# Patient Record
Sex: FEMALE | Race: WHITE | Employment: OTHER | URBAN - METROPOLITAN AREA
[De-identification: names, ages, dates, MRNs, and addresses within clinical notes are randomized per-mention and may not be internally consistent; named-entity substitution may affect disease eponyms.]

---

## 2019-01-28 ENCOUNTER — ED HISTORICAL/CONVERTED ENCOUNTER (OUTPATIENT)
Dept: OTHER | Age: 84
End: 2019-01-28

## 2019-10-09 ENCOUNTER — ED HISTORICAL/CONVERTED ENCOUNTER (OUTPATIENT)
Dept: OTHER | Age: 84
End: 2019-10-09

## 2019-10-27 ENCOUNTER — ED HISTORICAL/CONVERTED ENCOUNTER (OUTPATIENT)
Dept: OTHER | Age: 84
End: 2019-10-27

## 2020-01-28 ENCOUNTER — OP HISTORICAL/CONVERTED ENCOUNTER (OUTPATIENT)
Dept: OTHER | Age: 85
End: 2020-01-28

## 2020-09-08 ENCOUNTER — ED HISTORICAL/CONVERTED ENCOUNTER (OUTPATIENT)
Dept: OTHER | Age: 85
End: 2020-09-08

## 2020-09-21 ENCOUNTER — APPOINTMENT (OUTPATIENT)
Dept: CT IMAGING | Age: 85
End: 2020-09-21
Attending: EMERGENCY MEDICINE
Payer: MEDICARE

## 2020-09-21 ENCOUNTER — HOSPITAL ENCOUNTER (EMERGENCY)
Age: 85
Discharge: HOME | End: 2020-09-21
Attending: EMERGENCY MEDICINE
Payer: MEDICARE

## 2020-09-21 VITALS
WEIGHT: 116 LBS | HEART RATE: 88 BPM | TEMPERATURE: 98.7 F | OXYGEN SATURATION: 97 % | HEIGHT: 62 IN | RESPIRATION RATE: 16 BRPM | SYSTOLIC BLOOD PRESSURE: 170 MMHG | DIASTOLIC BLOOD PRESSURE: 88 MMHG | BODY MASS INDEX: 21.35 KG/M2

## 2020-09-21 DIAGNOSIS — S09.90XA INJURY OF HEAD, INITIAL ENCOUNTER: ICD-10-CM

## 2020-09-21 DIAGNOSIS — S01.01XA LACERATION OF SCALP, INITIAL ENCOUNTER: ICD-10-CM
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
## 2020-09-21 PROCEDURE — 72125 CT NECK SPINE W/O DYE: CPT

## 2020-09-21 PROCEDURE — 99283 EMERGENCY DEPT VISIT LOW MDM: CPT

## 2020-09-21 PROCEDURE — 74011250636 HC RX REV CODE- 250/636: Performed by: EMERGENCY MEDICINE

## 2020-09-21 PROCEDURE — 90715 TDAP VACCINE 7 YRS/> IM: CPT | Performed by: EMERGENCY MEDICINE

## 2020-09-21 PROCEDURE — 70450 CT HEAD/BRAIN W/O DYE: CPT

## 2020-09-21 PROCEDURE — 74011250637 HC RX REV CODE- 250/637: Performed by: EMERGENCY MEDICINE

## 2020-09-21 RX ADMIN — ACETAMINOPHEN 650 MG: 650 SOLUTION ORAL at 07:34

## 2020-09-21 RX ADMIN — TETANUS TOXOID, REDUCED DIPHTHERIA TOXOID AND ACELLULAR PERTUSSIS VACCINE, ADSORBED 0.5 ML: 5; 2.5; 8; 8; 2.5 SUSPENSION INTRAMUSCULAR at 07:34

## 2020-09-21 NOTE — ED TRIAGE NOTES
Daughter reports GLF at home. Patient did hit head without LOC. No blood thinner use. Patient reports headache and left leg pain.

## 2020-09-21 NOTE — ED PROVIDER NOTES
EMERGENCY DEPARTMENT HISTORY AND PHYSICAL EXAM      Date: 9/21/2020  Patient Name: Yuliana Farah    History of Presenting Illness     Chief Complaint   Patient presents with    Fall       History Provided By: Patient and Patient's Daughter    HPI: Yuliana Farah, 80 y.o. female with a past medical history significant stroke presents to the ED with cc of difficulty with ambulation since her stroke. She has been unsteady. She fell backwards hitting the back of her head. No loss of consciousness. Occurred this morning. When she tried to get up she felt slightly weaker on the left side where she has had the stroke in the past.  This is only at the left hip. There is no pain however. She is able to ambulate. No upper extremity pain or trauma. Small cut on the back of her head. Not actively bleeding. Immunizations of tetanus are unknown to family. Mild dull headache. No pain medicine prior to arrival.  No nausea vomiting. There are no other complaints, changes, or physical findings at this time. PCP: Katie Corley MD    Current Outpatient Medications   Medication Sig Dispense Refill    dabigatran etexilate (PRADAXA) 75 mg capsule Take 1 capsule by mouth every twelve (12) hours. Resume taking this on December 4th after you have completed 2 weeks of COumadin. Stop you coumadin when you restart this Pradaxa. 1 capsule 0    oxyCODONE IR (ROXICODONE) 5 mg immediate release tablet Take 1-2 tablets by mouth every three (3) hours as needed for Pain. 80 tablet 0    acetaminophen (TYLENOL) 325 mg tablet Take 2 tablets by mouth every six (6) hours. 40 tablet 0    polyethylene glycol (MIRALAX) 17 gram packet Take 1 packet by mouth daily as needed (constipation). 1 packet 0    simvastatin (ZOCOR) 80 mg tablet Take 80 mg by mouth nightly.  meclizine (ANTIVERT) 25 mg tablet Take 25 mg by mouth as needed.  DULOXETINE HCL (CYMBALTA PO) Take 60 mg by mouth nightly.       metoprolol (LOPRESSOR) 50 mg tablet Take 50 mg by mouth two (2) times a day.  FESOTERODINE FUMARATE (TOVIAZ PO) Take 4 mg by mouth daily.  levothyroxine (SYNTHROID) 25 mcg tablet Take 25 mcg by mouth Daily (before breakfast).  ALPRAZolam (XANAX) 0.25 mg tablet Take 0.25 mg by mouth as needed for Anxiety.  Dexlansoprazole (DEXILANT) 60 mg CpDB Take  by mouth nightly.  lisinopril (PRINIVIL, ZESTRIL) 20 mg tablet Take 20 mg by mouth every morning. Past History     Past Medical History:  Past Medical History:   Diagnosis Date    Arrhythmia     atrial fib    Arthritis     Chronic pain     In back and right foot    GERD (gastroesophageal reflux disease)     Hypercholesterolemia     Hypertension     Other ill-defined conditions(799.89)     right ventricular \"thickening\" of heart    Other ill-defined conditions(799.89)     blood transfusion hx--    Psychiatric disorder     depression    Thyroid disease     Unspecified adverse effect of anesthesia     Itching       Past Surgical History:  Past Surgical History:   Procedure Laterality Date    HX CATARACT REMOVAL      bilateral    HX OPEN CHOLECYSTECTOMY      HX ORTHOPAEDIC      Right Foot Surgery x 6    HX ORTHOPAEDIC      plate in foot    HX ORTHOPAEDIC      HX ORTHOPAEDIC  3/12/12     RIGHT TOTAL HIP REPLACEMENT    HX OTHER SURGICAL      hemorrhoidectomy    HX TONSILLECTOMY         Family History:  Family History   Problem Relation Age of Onset    Breast Cancer Sister 61    Cancer Sister         breast cancer       Social History:  Social History     Tobacco Use    Smoking status: Former Smoker     Packs/day: 1.00     Years: 5.00     Pack years: 5.00     Last attempt to quit: 11/10/1991     Years since quittin.8    Smokeless tobacco: Never Used   Substance Use Topics    Alcohol use: No    Drug use: No       Allergies:   Allergies   Allergen Reactions    Macrobid [Nitrofurantoin Monohyd/M-Cryst] Other (comments)     Needed to be placed on life support after taking with coumadin    Codeine Nausea Only    Lidocaine Itching    Morphine Itching         Review of Systems   Review of Systems   Constitutional: Negative. Negative for chills, fatigue and fever. HENT: Negative. Negative for congestion, nosebleeds and sore throat. Eyes: Negative. Negative for pain, discharge and visual disturbance. Respiratory: Negative. Negative for cough, chest tightness and shortness of breath. Cardiovascular: Negative for chest pain, palpitations and leg swelling. Gastrointestinal: Negative for abdominal pain, blood in stool, constipation, diarrhea, nausea and vomiting. Endocrine: Negative. Genitourinary: Negative. Negative for difficulty urinating, dysuria, pelvic pain and vaginal bleeding. Musculoskeletal: Negative. Negative for arthralgias, back pain and myalgias. Skin: Positive for wound. Negative for rash. Allergic/Immunologic: Negative. Neurological: Positive for headaches. Negative for dizziness, syncope, weakness and numbness. Hematological: Negative. Psychiatric/Behavioral: Negative. Negative for agitation, confusion and suicidal ideas. All other systems reviewed and are negative. Physical Exam   Physical Exam  Vitals signs and nursing note reviewed. Exam conducted with a chaperone present. Constitutional:       Appearance: Normal appearance. She is normal weight. HENT:      Head: Normocephalic and atraumatic. Nose: Nose normal.      Mouth/Throat:      Mouth: Mucous membranes are moist.      Pharynx: Oropharynx is clear. Eyes:      Extraocular Movements: Extraocular movements intact. Conjunctiva/sclera: Conjunctivae normal.      Pupils: Pupils are equal, round, and reactive to light. Neck:      Musculoskeletal: Normal range of motion and neck supple. No neck rigidity or muscular tenderness. Cardiovascular:      Rate and Rhythm: Normal rate and regular rhythm. Pulses: Normal pulses. Heart sounds: Normal heart sounds. Pulmonary:      Effort: Pulmonary effort is normal. No respiratory distress. Breath sounds: Normal breath sounds. Abdominal:      General: Abdomen is flat. Bowel sounds are normal. There is no distension. Palpations: Abdomen is soft. Tenderness: There is no abdominal tenderness. There is no guarding. Musculoskeletal: Normal range of motion. General: No swelling, tenderness, deformity or signs of injury. Right lower leg: No edema. Left lower leg: No edema. Skin:     General: Skin is warm and dry. Capillary Refill: Capillary refill takes less than 2 seconds. Findings: No lesion or rash. Comments: Half centimeter puncture wound posterior scalp not actively bleeding. Neurological:      General: No focal deficit present. Mental Status: She is alert and oriented to person, place, and time. Mental status is at baseline. Cranial Nerves: No cranial nerve deficit. Psychiatric:         Mood and Affect: Mood normal.         Behavior: Behavior normal.         Thought Content: Thought content normal.         Judgment: Judgment normal.         Diagnostic Study Results     Labs -   No results found for this or any previous visit (from the past 12 hour(s)). Radiologic Studies -   CT HEAD WO CONT   Final Result   Impression:   No acute intracranial abnormality. Chronic changes as above. CT SPINE CERV WO CONT   Final Result   Impression:   Moderate degenerative disc disease. No evidence of acute traumatic injury of the   cervical spine. CT Results  (Last 48 hours)               09/21/20 0807  CT HEAD WO CONT Final result    Impression:  Impression:   No acute intracranial abnormality. Chronic changes as above. Narrative:  Study: Head CT without contrast.       Clinical indication: Fall, head injury.        Technique: Routine volume acquisition of the head was obtained without IV   contrast. Coronal and sagittal reformations were generated in soft tissue and   bone kernels. Dose reduction: All CT scans at this facility are performed using   dose reduction optimization techniques as appropriate to a performed exam   including the following: Automated exposure control, adjustments of the mA   and/or kV according to patient size, or use of iterative reconstruction   technique. Comparison: Head CT 9/15/2015. Findings:        Generalized cerebral volume loss with compensatory prominence of the ventricular   system. No evidence of acute intrarenal hemorrhage, mass effect, midline shift   or abnormal extra-axial fluid collection. Prominent periventricular and   subcortical white matter hypodensities bilaterally, suggesting chronic small   vessel ischemic change. Gray-white matter differentiation is maintained. Remote   infarction in the right basal ganglia. Moderate vascular calcifications. No evidence of skull fracture. Ventricles and basal cisterns are preserved and   are symmetric. Globes and orbits are intact. 09/21/20 0807  CT SPINE CERV WO CONT Final result    Impression:  Impression:   Moderate degenerative disc disease. No evidence of acute traumatic injury of the   cervical spine. Narrative:  Study: CT of the cervical spine without contrast.       Clinical indication: Fall, head injury. Technique: CT volume acquisition through the cervical spine was obtained. Axial,   sagittal, coronal images were provided in bone and soft tissue kernels. Dose   reduction: All CT scans at this facility are performed using dose reduction   optimization techniques as appropriate to a performed exam including the   following: Automated exposure control, adjustments of the mA and/or kV according   to patient size, or use of iterative reconstruction technique. Comparison: CT cervical spine 1/27/2011. Findings:       Straightening of the normal cervical lordosis.  Alignment is otherwise anatomic. Vertebral body heights are maintained. Moderate degenerative disc disease with   multilevel disc height loss, endplate changes and anterior/posterior   osteophytes. No prevertebral soft tissue swelling. Severe carotid atherosclerosis. Visualized   lung apices are clear. CXR Results  (Last 48 hours)    None          Medical Decision Making and ED Course   I am the first provider for this patient. I reviewed the vital signs, available nursing notes, past medical history, past surgical history, family history and social history. Vital Signs-Reviewed the patient's vital signs. Patient Vitals for the past 12 hrs:   Temp Pulse Resp BP SpO2   09/21/20 0657 98.7 °F (37.1 °C) 88 16 (!) 170/88 97 %       EKG interpretation:         Records Reviewed: Previous Hospital chart. EMS run report          ED Course:   Initial assessment performed. The patients presenting problems have been discussed, and they are in agreement with the care plan formulated and outlined with them. I have encouraged them to ask questions as they arise throughout their visit. CONSULTANTS:      Provider Notes (Medical Decision Making):   59-year-old female not on blood thinner suffered a mechanical fall this morning secondary to difficulty walking due to old strokes. Plan to rule out acute bleed with CT head. Update tetanus. Procedures                       Disposition           DISCHARGE PLAN:    Patient is discharged home. Discharge instructions provided. Patient is stable and improved at time of disposition. Vitals are stable. 1.   Current Discharge Medication List      CONTINUE these medications which have NOT CHANGED    Details   dabigatran etexilate (PRADAXA) 75 mg capsule Take 1 capsule by mouth every twelve (12) hours. Resume taking this on December 4th after you have completed 2 weeks of COumadin. Stop you coumadin when you restart this Pradaxa.   Qty: 1 capsule, Refills: 0 oxyCODONE IR (ROXICODONE) 5 mg immediate release tablet Take 1-2 tablets by mouth every three (3) hours as needed for Pain. Qty: 80 tablet, Refills: 0      acetaminophen (TYLENOL) 325 mg tablet Take 2 tablets by mouth every six (6) hours. Qty: 40 tablet, Refills: 0      polyethylene glycol (MIRALAX) 17 gram packet Take 1 packet by mouth daily as needed (constipation). Qty: 1 packet, Refills: 0      simvastatin (ZOCOR) 80 mg tablet Take 80 mg by mouth nightly. meclizine (ANTIVERT) 25 mg tablet Take 25 mg by mouth as needed. DULOXETINE HCL (CYMBALTA PO) Take 60 mg by mouth nightly. metoprolol (LOPRESSOR) 50 mg tablet Take 50 mg by mouth two (2) times a day. FESOTERODINE FUMARATE (TOVIAZ PO) Take 4 mg by mouth daily. levothyroxine (SYNTHROID) 25 mcg tablet Take 25 mcg by mouth Daily (before breakfast). ALPRAZolam (XANAX) 0.25 mg tablet Take 0.25 mg by mouth as needed for Anxiety. Dexlansoprazole (DEXILANT) 60 mg CpDB Take  by mouth nightly. lisinopril (PRINIVIL, ZESTRIL) 20 mg tablet Take 20 mg by mouth every morning. 2.   Follow-up Information     Follow up With Specialties Details Why Contact Terry Medina MD Family Medicine Call in 2 days  [facility] [address]  [address] [facility]  [phone]          3. Return to ED if worse     Pt voiced they understand they plan and do not have questions at this time        Diagnosis     Clinical Impression:   1. Fall, initial encounter    2. Injury of head, initial encounter    3. Laceration of scalp, initial encounter        Attestations:    Kenisha Cee MD    Please note that this dictation was completed with [employer], the computer voice recognition software. Quite often unanticipated grammatical, syntax, homophones, and other interpretive errors are inadvertently transcribed by the computer software. Please disregard these errors. Please excuse any errors that have escaped final proofreading. Thank you.

## 2020-09-21 NOTE — DISCHARGE INSTRUCTIONS
Patient Education        Preventing Falls: Care Instructions  Your Care Instructions    Getting around your home safely can be a challenge if you have injuries or health problems that make it easy for you to fall. Loose rugs and furniture in walkways are among the dangers for many older people who have problems walking or who have poor eyesight. People who have conditions such as arthritis, osteoporosis, or dementia also have to be careful not to fall. You can make your home safer with a few simple measures. Follow-up care is a key part of your treatment and safety. Be sure to make and go to all appointments, and call your doctor if you are having problems. It's also a good idea to know your test results and keep a list of the medicines you take. How can you care for yourself at home? Taking care of yourself  · You may get dizzy if you do not drink enough water. To prevent dehydration, drink plenty of fluids, enough so that your urine is light yellow or clear like water. Choose water and other caffeine-free clear liquids. If you have kidney, heart, or liver disease and have to limit fluids, talk with your doctor before you increase the amount of fluids you drink. · Exercise regularly to improve your strength, muscle tone, and balance. Walk if you can. Swimming may be a good choice if you cannot walk easily. · Have your vision and hearing checked each year or any time you notice a change. If you have trouble seeing and hearing, you might not be able to avoid objects and could lose your balance. · Know the side effects of the medicines you take. Ask your doctor or pharmacist whether the medicines you take can affect your balance. Sleeping pills or sedatives can affect your balance. · Limit the amount of alcohol you drink. Alcohol can impair your balance and other senses. · Ask your doctor whether calluses or corns on your feet need to be removed.  If you wear loose-fitting shoes because of calluses or corns, you can lose your balance and fall. · Talk to your doctor if you have numbness in your feet. Preventing falls at home  · Remove raised doorway thresholds, throw rugs, and clutter. Repair loose carpet or raised areas in the floor. · Move furniture and electrical cords to keep them out of walking paths. · Use nonskid floor wax, and wipe up spills right away, especially on ceramic tile floors. · If you use a walker or cane, put rubber tips on it. If you use crutches, clean the bottoms of them regularly with an abrasive pad, such as steel wool. · Keep your house well lit, especially [place], and outside walkways. Use night-lights in areas such as hallways and bathrooms. Add extra light switches or use remote switches (such as switches that go on or off when you clap your hands) to make it easier to turn lights on if you have to get up during the night. · Install sturdy handrails on stairways. · Move items in your cabinets so that the things you use a lot are on the lower shelves (about waist level). · Keep a cordless phone and a flashlight with new batteries by your bed. If possible, put a phone in each of the main rooms of your house, or carry a cell phone in case you fall and cannot reach a phone. Or, you can wear a device around your neck or wrist. You push a button that sends a signal for help. · Wear low-heeled shoes that fit well and give your feet good support. Use footwear with nonskid soles. Check the heels and soles of your shoes for wear. Repair or replace worn heels or soles. · Do not wear socks without shoes on wood floors. · Walk on the grass when the sidewalks are slippery. If you live in an area that gets snow and ice in the winter, sprinkle salt on slippery steps and sidewalks. Preventing falls in the bath  · Install grab bars and nonskid mats inside and outside your shower or tub and near the toilet and sinks. · Use shower chairs and bath benches.   · Use a hand-held shower head that will allow you to sit while showering. · Get into a tub or shower by putting the weaker leg in first. Get out of a tub or shower with your strong side first.  · Repair loose toilet seats and consider installing a raised toilet seat to make getting on and off the toilet easier. · Keep your bathroom door unlocked while you are in the shower. Where can you learn more? Go to http://www.pereira.com/. Enter [phone] in the search box to learn more about \"Preventing Falls: Care Instructions. \"  Current as of: March 16, 2018  Content Version: 11.8  © 6117-3589 [employer], [employer]. Care instructions adapted under license by [employer] (which disclaims liability or warranty for this information). If you have questions about a medical condition or this instruction, always ask your healthcare professional. [address] any warranty or liability for your use of this information.

## 2020-11-16 ENCOUNTER — APPOINTMENT (OUTPATIENT)
Dept: CT IMAGING | Age: 85
End: 2020-11-16
Attending: EMERGENCY MEDICINE
Payer: MEDICARE

## 2020-11-16 ENCOUNTER — HOSPITAL ENCOUNTER (INPATIENT)
Age: 85
LOS: 3 days | Discharge: HOME | DRG: 884 | End: 2020-11-19
Attending: FAMILY MEDICINE | Admitting: INTERNAL MEDICINE
Payer: MEDICARE

## 2020-11-16 ENCOUNTER — APPOINTMENT (OUTPATIENT)
Dept: GENERAL RADIOLOGY | Age: 85
End: 2020-11-16
Attending: EMERGENCY MEDICINE
Payer: MEDICARE

## 2020-11-16 ENCOUNTER — HOSPITAL ENCOUNTER (EMERGENCY)
Age: 85
Discharge: OTHER INSTITUTION | End: 2020-11-16
Attending: EMERGENCY MEDICINE
Payer: MEDICARE

## 2020-11-16 VITALS
HEART RATE: 93 BPM | HEIGHT: 62 IN | DIASTOLIC BLOOD PRESSURE: 110 MMHG | OXYGEN SATURATION: 97 % | BODY MASS INDEX: 21.35 KG/M2 | TEMPERATURE: 98.6 F | WEIGHT: 116 LBS | RESPIRATION RATE: 16 BRPM | SYSTOLIC BLOOD PRESSURE: 183 MMHG

## 2020-11-16 DIAGNOSIS — R77.8 ELEVATED TROPONIN: ICD-10-CM

## 2020-11-16 DIAGNOSIS — I10 BENIGN ESSENTIAL HTN: ICD-10-CM

## 2020-11-16 DIAGNOSIS — R41.82 ALTERED MENTAL STATUS, UNSPECIFIED ALTERED MENTAL STATUS TYPE: ICD-10-CM

## 2020-11-16 DIAGNOSIS — I48.20 CHRONIC A-FIB (HCC): ICD-10-CM

## 2020-11-16 DIAGNOSIS — R41.0 ACUTE DELIRIUM: ICD-10-CM

## 2020-11-16 DIAGNOSIS — I21.4 NSTEMI (NON-ST ELEVATED MYOCARDIAL INFARCTION) (HCC): Primary | ICD-10-CM

## 2020-11-16 LAB
ALBUMIN SERPL-MCNC: 3.7 G/DL (ref 3.5–5)
ALBUMIN/GLOB SERPL: 1.2 {RATIO} (ref 1.1–2.2)
ALP SERPL-CCNC: 60 U/L (ref 45–117)
ALT SERPL-CCNC: 13 U/L (ref 12–78)
ANION GAP SERPL CALC-SCNC: 7 MMOL/L (ref 5–15)
APPEARANCE UR: CLEAR
AST SERPL W P-5'-P-CCNC: 18 U/L (ref 15–37)
BACTERIA URNS QL MICRO: NEGATIVE /HPF
BASOPHILS # BLD: 0.1 K/UL (ref 0–0.1)
BASOPHILS NFR BLD: 1 % (ref 0–1)
BILIRUB SERPL-MCNC: 0.5 MG/DL (ref 0.2–1)
BILIRUB UR QL: NEGATIVE
BUN SERPL-MCNC: 23 MG/DL (ref 6–20)
BUN/CREAT SERPL: 26 (ref 12–20)
CA-I BLD-MCNC: 9.9 MG/DL (ref 8.5–10.1)
CHLORIDE SERPL-SCNC: 106 MMOL/L (ref 97–108)
CO2 SERPL-SCNC: 27 MMOL/L (ref 21–32)
COLOR UR: YELLOW
CREAT SERPL-MCNC: 0.87 MG/DL (ref 0.55–1.02)
DIFFERENTIAL METHOD BLD: ABNORMAL
EOSINOPHIL # BLD: 0.2 K/UL (ref 0–0.4)
EOSINOPHIL NFR BLD: 2 % (ref 0–7)
EPITH CASTS URNS QL MICRO: ABNORMAL /LPF
ERYTHROCYTE [DISTWIDTH] IN BLOOD BY AUTOMATED COUNT: 13.1 % (ref 11.5–14.5)
GLOBULIN SER CALC-MCNC: 3.1 G/DL (ref 2–4)
GLUCOSE SERPL-MCNC: 99 MG/DL (ref 65–100)
GLUCOSE UR STRIP.AUTO-MCNC: NEGATIVE MG/DL
HCT VFR BLD AUTO: 37.6 % (ref 35–47)
HGB BLD-MCNC: 11.9 G/DL (ref 11.5–16)
HGB UR QL STRIP: ABNORMAL
IMM GRANULOCYTES # BLD AUTO: 0 K/UL (ref 0–0.04)
IMM GRANULOCYTES NFR BLD AUTO: 0 % (ref 0–0.5)
KETONES UR QL STRIP.AUTO: NEGATIVE MG/DL
LEUKOCYTE ESTERASE UR QL STRIP.AUTO: NEGATIVE
LYMPHOCYTES # BLD: 1.5 K/UL (ref 0.8–3.5)
LYMPHOCYTES NFR BLD: 22 % (ref 12–49)
MCH RBC QN AUTO: 31.5 PG (ref 26–34)
MCHC RBC AUTO-ENTMCNC: 31.6 G/DL (ref 30–36.5)
MCV RBC AUTO: 99.5 FL (ref 80–99)
MONOCYTES # BLD: 0.6 K/UL (ref 0–1)
MONOCYTES NFR BLD: 9 % (ref 5–13)
NEUTS SEG # BLD: 4.6 K/UL (ref 1.8–8)
NEUTS SEG NFR BLD: 66 % (ref 32–75)
NITRITE UR QL STRIP.AUTO: NEGATIVE
PH UR STRIP: 5 [PH] (ref 5–8)
PLATELET # BLD AUTO: 184 K/UL (ref 150–400)
PMV BLD AUTO: 9.3 FL (ref 8.9–12.9)
POTASSIUM SERPL-SCNC: 4 MMOL/L (ref 3.5–5.1)
PROT SERPL-MCNC: 6.8 G/DL (ref 6.4–8.2)
PROT UR STRIP-MCNC: NEGATIVE MG/DL
RBC # BLD AUTO: 3.78 M/UL (ref 3.8–5.2)
RBC #/AREA URNS HPF: ABNORMAL /HPF (ref 0–5)
SODIUM SERPL-SCNC: 140 MMOL/L (ref 136–145)
SP GR UR REFRACTOMETRY: 1.02 (ref 1–1.03)
TROPONIN I SERPL-MCNC: 0.1 NG/ML
TROPONIN I SERPL-MCNC: 0.11 NG/ML
UA: UC IF INDICATED,UAUC: ABNORMAL
UROBILINOGEN UR QL STRIP.AUTO: 0.1 EU/DL (ref 0.2–1)
WBC # BLD AUTO: 6.9 K/UL (ref 3.6–11)
WBC URNS QL MICRO: ABNORMAL /HPF (ref 0–4)

## 2020-11-16 PROCEDURE — 71045 X-RAY EXAM CHEST 1 VIEW: CPT

## 2020-11-16 PROCEDURE — 93005 ELECTROCARDIOGRAM TRACING: CPT

## 2020-11-16 PROCEDURE — 80053 COMPREHEN METABOLIC PANEL: CPT

## 2020-11-16 PROCEDURE — 84484 ASSAY OF TROPONIN QUANT: CPT

## 2020-11-16 PROCEDURE — 74011250636 HC RX REV CODE- 250/636: Performed by: INTERNAL MEDICINE

## 2020-11-16 PROCEDURE — 81001 URINALYSIS AUTO W/SCOPE: CPT

## 2020-11-16 PROCEDURE — 99285 EMERGENCY DEPT VISIT HI MDM: CPT

## 2020-11-16 PROCEDURE — 85025 COMPLETE CBC W/AUTO DIFF WBC: CPT

## 2020-11-16 PROCEDURE — 70450 CT HEAD/BRAIN W/O DYE: CPT

## 2020-11-16 PROCEDURE — 65660000000 HC RM CCU STEPDOWN

## 2020-11-16 PROCEDURE — 36415 COLL VENOUS BLD VENIPUNCTURE: CPT

## 2020-11-16 PROCEDURE — 74011250637 HC RX REV CODE- 250/637: Performed by: EMERGENCY MEDICINE

## 2020-11-16 PROCEDURE — 74011250637 HC RX REV CODE- 250/637: Performed by: INTERNAL MEDICINE

## 2020-11-16 RX ORDER — DABIGATRAN ETEXILATE 75 MG/1
75 CAPSULE ORAL EVERY 12 HOURS
Status: DISCONTINUED | OUTPATIENT
Start: 2020-11-17 | End: 2020-11-17

## 2020-11-16 RX ORDER — ACETAMINOPHEN 325 MG/1
650 TABLET ORAL
Status: DISCONTINUED | OUTPATIENT
Start: 2020-11-16 | End: 2020-11-19 | Stop reason: HOSPADM

## 2020-11-16 RX ORDER — OXYCODONE HYDROCHLORIDE 5 MG/1
5-10 TABLET ORAL
Status: DISCONTINUED | OUTPATIENT
Start: 2020-11-16 | End: 2020-11-17

## 2020-11-16 RX ORDER — ONDANSETRON 2 MG/ML
4 INJECTION INTRAMUSCULAR; INTRAVENOUS
Status: DISCONTINUED | OUTPATIENT
Start: 2020-11-16 | End: 2020-11-19 | Stop reason: HOSPADM

## 2020-11-16 RX ORDER — PROMETHAZINE HYDROCHLORIDE 25 MG/1
12.5 TABLET ORAL
Status: DISCONTINUED | OUTPATIENT
Start: 2020-11-16 | End: 2020-11-19 | Stop reason: HOSPADM

## 2020-11-16 RX ORDER — ACETAMINOPHEN 650 MG/1
650 SUPPOSITORY RECTAL
Status: DISCONTINUED | OUTPATIENT
Start: 2020-11-16 | End: 2020-11-19 | Stop reason: HOSPADM

## 2020-11-16 RX ORDER — DULOXETIN HYDROCHLORIDE 60 MG/1
60 CAPSULE, DELAYED RELEASE ORAL
Status: DISCONTINUED | OUTPATIENT
Start: 2020-11-16 | End: 2020-11-17

## 2020-11-16 RX ORDER — SODIUM CHLORIDE 0.9 % (FLUSH) 0.9 %
5-40 SYRINGE (ML) INJECTION EVERY 8 HOURS
Status: DISCONTINUED | OUTPATIENT
Start: 2020-11-16 | End: 2020-11-19 | Stop reason: HOSPADM

## 2020-11-16 RX ORDER — ATORVASTATIN CALCIUM 40 MG/1
40 TABLET, FILM COATED ORAL
Status: DISCONTINUED | OUTPATIENT
Start: 2020-11-17 | End: 2020-11-19 | Stop reason: HOSPADM

## 2020-11-16 RX ORDER — ALPRAZOLAM 0.25 MG/1
0.25 TABLET ORAL
Status: DISCONTINUED | OUTPATIENT
Start: 2020-11-17 | End: 2020-11-17

## 2020-11-16 RX ORDER — PANTOPRAZOLE SODIUM 40 MG/1
40 TABLET, DELAYED RELEASE ORAL
Status: DISCONTINUED | OUTPATIENT
Start: 2020-11-17 | End: 2020-11-19 | Stop reason: HOSPADM

## 2020-11-16 RX ORDER — LEVOTHYROXINE SODIUM 25 UG/1
25 TABLET ORAL
Status: DISCONTINUED | OUTPATIENT
Start: 2020-11-17 | End: 2020-11-19 | Stop reason: HOSPADM

## 2020-11-16 RX ORDER — SODIUM CHLORIDE 0.9 % (FLUSH) 0.9 %
5-40 SYRINGE (ML) INJECTION AS NEEDED
Status: DISCONTINUED | OUTPATIENT
Start: 2020-11-16 | End: 2020-11-19 | Stop reason: HOSPADM

## 2020-11-16 RX ORDER — MECLIZINE HCL 12.5 MG 12.5 MG/1
25 TABLET ORAL DAILY PRN
Status: DISCONTINUED | OUTPATIENT
Start: 2020-11-17 | End: 2020-11-19 | Stop reason: HOSPADM

## 2020-11-16 RX ORDER — SODIUM CHLORIDE, SODIUM LACTATE, POTASSIUM CHLORIDE, CALCIUM CHLORIDE 600; 310; 30; 20 MG/100ML; MG/100ML; MG/100ML; MG/100ML
75 INJECTION, SOLUTION INTRAVENOUS CONTINUOUS
Status: DISCONTINUED | OUTPATIENT
Start: 2020-11-16 | End: 2020-11-17

## 2020-11-16 RX ORDER — METOPROLOL TARTRATE 50 MG/1
50 TABLET ORAL 2 TIMES DAILY
Status: DISCONTINUED | OUTPATIENT
Start: 2020-11-17 | End: 2020-11-19 | Stop reason: HOSPADM

## 2020-11-16 RX ORDER — GUAIFENESIN 100 MG/5ML
162 LIQUID (ML) ORAL
Status: COMPLETED | OUTPATIENT
Start: 2020-11-16 | End: 2020-11-16

## 2020-11-16 RX ORDER — LISINOPRIL 20 MG/1
20 TABLET ORAL
Status: DISCONTINUED | OUTPATIENT
Start: 2020-11-17 | End: 2020-11-19 | Stop reason: HOSPADM

## 2020-11-16 RX ORDER — POLYETHYLENE GLYCOL 3350 17 G/17G
17 POWDER, FOR SOLUTION ORAL DAILY PRN
Status: DISCONTINUED | OUTPATIENT
Start: 2020-11-16 | End: 2020-11-19 | Stop reason: HOSPADM

## 2020-11-16 RX ADMIN — ACETAMINOPHEN 650 MG: 325 TABLET ORAL at 22:25

## 2020-11-16 RX ADMIN — SODIUM CHLORIDE, SODIUM LACTATE, POTASSIUM CHLORIDE, AND CALCIUM CHLORIDE 75 ML/HR: 600; 310; 30; 20 INJECTION, SOLUTION INTRAVENOUS at 22:27

## 2020-11-16 RX ADMIN — ASPIRIN 81 MG 162 MG: 81 TABLET ORAL at 15:36

## 2020-11-16 NOTE — ED NOTES
Pt is oriented to Name and place, confused on day of week,  Daughter said she is off and on with orientation. States that she refuses all meds for past 6 months. Lungs clear, denies cp or sob. VS taken and Dr. Diego Anglin notified of BP.

## 2020-11-16 NOTE — ED TRIAGE NOTES
Pt refuses to take home meds at least 6 months PCP is aware Yesterday, urine foul smell, weakness, states she has hallucinations, last one 1 year ago hasn't passed water in 24 hours. Hx UTI, Called urologist and PCP and they cant see her today so they came. PT knows name only which is normal for her.

## 2020-11-16 NOTE — ED PROVIDER NOTES
EMERGENCY DEPARTMENT HISTORY AND PHYSICAL EXAM      Date: 11/16/2020  Patient Name: Dionne Gonsalez    History of Presenting Illness     Chief Complaint   Patient presents with    Bladder Infection       History Provided By: Patient's Daughter    HPI: Dionne Gonsalez, 80 y.o. female with a history of atrial fibrillation, chronic pain, GERD, hypercholesterolemia, hypertension, depression, thyroid disease presents to the emergency department today because her daughter thinks that she has a urinary tract infection. She stated that for the last couple of days her urine has smelled foul and she has had generalized weakness as well as hallucinations last night. She has had a decrease in urination as well for the past 24 hours. Patient does have a history of UTI. They called the urologist and PCP and they cannot see her today so they came in. The patient apparently has not been taking her medications for the last 6 months and her PCP is aware. The patient used to be on maintenance medication to prevent UTIs but was taken off that a couple of months ago. The entire history is obtained by the patient's daughter. The patient has not yet been specifically diagnosed with dementia but they state that she has some test coming up for further evaluation of this. She is oriented to name only and apparently that is baseline for her. The daughter denies fevers or chills. No nausea or vomiting. The patient always complains of pain everywhere but has not specifically complained of any abdominal pain. There are no other complaints, changes, or physical findings at this time. PCP: Abdi Salvador MD    No current facility-administered medications on file prior to encounter. Current Outpatient Medications on File Prior to Encounter   Medication Sig Dispense Refill    dabigatran etexilate (PRADAXA) 75 mg capsule Take 1 capsule by mouth every twelve (12) hours.  Resume taking this on December 4th after you have completed 2 weeks of COumadin. Stop you coumadin when you restart this Pradaxa. 1 capsule 0    oxyCODONE IR (ROXICODONE) 5 mg immediate release tablet Take 1-2 tablets by mouth every three (3) hours as needed for Pain. 80 tablet 0    polyethylene glycol (MIRALAX) 17 gram packet Take 1 packet by mouth daily as needed (constipation). 1 packet 0    simvastatin (ZOCOR) 80 mg tablet Take 80 mg by mouth nightly.  meclizine (ANTIVERT) 25 mg tablet Take 25 mg by mouth as needed.  DULOXETINE HCL (CYMBALTA PO) Take 60 mg by mouth nightly.  metoprolol (LOPRESSOR) 50 mg tablet Take 50 mg by mouth two (2) times a day.  FESOTERODINE FUMARATE (TOVIAZ PO) Take 4 mg by mouth daily.  levothyroxine (SYNTHROID) 25 mcg tablet Take 25 mcg by mouth Daily (before breakfast).  ALPRAZolam (XANAX) 0.25 mg tablet Take 0.25 mg by mouth as needed for Anxiety.  Dexlansoprazole (DEXILANT) 60 mg CpDB Take  by mouth nightly.  lisinopril (PRINIVIL, ZESTRIL) 20 mg tablet Take 20 mg by mouth every morning.  [DISCONTINUED] acetaminophen (TYLENOL) 325 mg tablet Take 2 tablets by mouth every six (6) hours.  40 tablet 0       Past History     Past Medical History:  Past Medical History:   Diagnosis Date    Arrhythmia     atrial fib    Arthritis     Chronic pain     In back and right foot    GERD (gastroesophageal reflux disease)     Hypercholesterolemia     Hypertension     Other ill-defined conditions(799.89)     right ventricular \"thickening\" of heart    Other ill-defined conditions(799.89)     blood transfusion hx--2012    Psychiatric disorder     depression    Thyroid disease     Unspecified adverse effect of anesthesia     Itching       Past Surgical History:  Past Surgical History:   Procedure Laterality Date    HX CATARACT REMOVAL      bilateral    HX OPEN CHOLECYSTECTOMY      HX ORTHOPAEDIC      Right Foot Surgery x 6    HX ORTHOPAEDIC      plate in foot    HX ORTHOPAEDIC      HX ORTHOPAEDIC 3/12/12     RIGHT TOTAL HIP REPLACEMENT    HX OTHER SURGICAL      hemorrhoidectomy    HX TONSILLECTOMY         Family History:  Family History   Problem Relation Age of Onset    Breast Cancer Sister 61    Cancer Sister         breast cancer       Social History:  Social History     Tobacco Use    Smoking status: Former Smoker     Packs/day: 1.00     Years: 5.00     Pack years: 5.00     Last attempt to quit: 11/10/1991     Years since quittin.0    Smokeless tobacco: Never Used   Substance Use Topics    Alcohol use: Never     Frequency: Never    Drug use: Never       Allergies: Allergies   Allergen Reactions    Macrobid [Nitrofurantoin Monohyd/M-Cryst] Other (comments)     Needed to be placed on life support after taking with coumadin    Codeine Nausea Only    Lidocaine Itching    Morphine Itching         Review of Systems   Unable to obtain due to probable dementia and altered mental status  Review of Systems    Physical Exam     Physical Exam  Vitals signs and nursing note reviewed. Constitutional:       General: She is not in acute distress. Appearance: Normal appearance. She is not ill-appearing, toxic-appearing or diaphoretic. HENT:      Head: Normocephalic and atraumatic. Mouth/Throat:      Mouth: Mucous membranes are moist.      Pharynx: Oropharynx is clear. Eyes:      Extraocular Movements: Extraocular movements intact. Conjunctiva/sclera: Conjunctivae normal.      Pupils: Pupils are equal, round, and reactive to light. Neck:      Musculoskeletal: Neck supple. No neck rigidity. Cardiovascular:      Rate and Rhythm: Normal rate. Rhythm irregular. Heart sounds: Normal heart sounds. No murmur. No friction rub. No gallop. Pulmonary:      Effort: Pulmonary effort is normal. No respiratory distress. Breath sounds: Normal breath sounds. No wheezing, rhonchi or rales. Abdominal:      General: Abdomen is flat. There is no distension.       Palpations: Abdomen is soft.      Tenderness: There is abdominal tenderness. Musculoskeletal:         General: No swelling or tenderness. Lymphadenopathy:      Cervical: No cervical adenopathy. Skin:     General: Skin is warm and dry. Findings: No erythema or rash. Neurological:      General: No focal deficit present. Mental Status: She is alert. Comments: Awake and alert   Psychiatric:         Mood and Affect: Mood normal.         Behavior: Behavior normal.         Diagnostic Study Results     Labs -     Recent Results (from the past 12 hour(s))   URINALYSIS W/ REFLEX CULTURE    Collection Time: 11/16/20 11:30 AM    Specimen: Urine   Result Value Ref Range    Color Yellow      Appearance Clear Clear      Specific gravity 1.020 1.003 - 1.030      pH (UA) 5.0 5.0 - 8.0      Protein Negative Negative mg/dL    Glucose Negative Negative mg/dL    Ketone Negative Negative mg/dL    Bilirubin Negative Negative      Blood Trace (A) Negative      Urobilinogen 0.1 (L) 0.2 - 1.0 EU/dL    Nitrites Negative Negative      Leukocyte Esterase Negative Negative      WBC 0-4 0 - 4 /hpf    RBC 0-5 0 - 5 /hpf    Epithelial cells Moderate (A) Few /lpf    Bacteria Negative Negative /hpf    UA:UC IF INDICATED Culture not indicated by UA result Culture not indicated by UA result     CBC WITH AUTOMATED DIFF    Collection Time: 11/16/20 12:45 PM   Result Value Ref Range    WBC 6.9 3.6 - 11.0 K/uL    RBC 3.78 (L) 3.80 - 5.20 M/uL    HGB 11.9 11.5 - 16.0 g/dL    HCT 37.6 35.0 - 47.0 %    MCV 99.5 (H) 80.0 - 99.0 FL    MCH 31.5 26.0 - 34.0 PG    MCHC 31.6 30.0 - 36.5 g/dL    RDW 13.1 11.5 - 14.5 %    PLATELET 580 145 - 585 K/uL    MPV 9.3 8.9 - 12.9 FL    NEUTROPHILS 66 32 - 75 %    LYMPHOCYTES 22 12 - 49 %    MONOCYTES 9 5 - 13 %    EOSINOPHILS 2 0 - 7 %    BASOPHILS 1 0 - 1 %    IMMATURE GRANULOCYTES 0 0.0 - 0.5 %    ABS. NEUTROPHILS 4.6 1.8 - 8.0 K/UL    ABS. LYMPHOCYTES 1.5 0.8 - 3.5 K/UL    ABS. MONOCYTES 0.6 0.0 - 1.0 K/UL    ABS. EOSINOPHILS 0.2 0.0 - 0.4 K/UL    ABS. BASOPHILS 0.1 0.0 - 0.1 K/UL    ABS. IMM. GRANS. 0.0 0.00 - 0.04 K/UL    DF AUTOMATED     METABOLIC PANEL, COMPREHENSIVE    Collection Time: 11/16/20 12:45 PM   Result Value Ref Range    Sodium 140 136 - 145 mmol/L    Potassium 4.0 3.5 - 5.1 mmol/L    Chloride 106 97 - 108 mmol/L    CO2 27 21 - 32 mmol/L    Anion gap 7 5 - 15 mmol/L    Glucose 99 65 - 100 mg/dL    BUN 23 (H) 6 - 20 mg/dL    Creatinine 0.87 0.55 - 1.02 mg/dL    BUN/Creatinine ratio 26 (H) 12 - 20      GFR est AA >60 >60 ml/min/1.73m2    GFR est non-AA >60 >60 ml/min/1.73m2    Calcium 9.9 8.5 - 10.1 mg/dL    Bilirubin, total 0.5 0.2 - 1.0 mg/dL    AST (SGOT) 18 15 - 37 U/L    ALT (SGPT) 13 12 - 78 U/L    Alk. phosphatase 60 45 - 117 U/L    Protein, total 6.8 6.4 - 8.2 g/dL    Albumin 3.7 3.5 - 5.0 g/dL    Globulin 3.1 2.0 - 4.0 g/dL    A-G Ratio 1.2 1.1 - 2.2     TROPONIN I    Collection Time: 11/16/20 12:45 PM   Result Value Ref Range    Troponin-I, Qt. 0.10 (H) <0.05 ng/mL       Radiologic Studies -   @lastxrresult@  CT Results  (Last 48 hours)               11/16/20 1325  CT HEAD WO CONT Final result    Impression:  Impression: Periventricular/subcortical gliosis related to nonacute end vessel   occlusive change. Similar findings compared to CT head 9/21/2020. No   intracranial hemorrhage. Narrative:  CT head. Comparison CT head September 21, 2020. Axial images are reviewed along with reformatted sagittal/coronal images. Dose reduction: All CT scans at this facility are performed using    dose reduction optimization techniques as appropriate to a performed exam   including the following-   automated exposure control, adjustments of mA and/or Kv according to patient   size, or use of iterative reconstructive technique. .       Bone windows demonstrate normal aeration imaged sinuses and mastoid air cells. Prior ophthalmologic surgery.        Review of intracranial content reveals ventricular and sulcal prominence related   to cerebral atrophy. There is same distribution low density through   periventricular/subcortical white matter related to gliosis as part of advanced   nonacute end vessel occlusive change. Central cerebral arteriosclerosis. No   intracranial hemorrhage. CXR Results  (Last 48 hours)               11/16/20 1332  XR CHEST PORT Final result    Narrative:  Chest single view. Comparison single view chest October 27, 2019. Unchanged coarse reticular markings throughout the lungs. No gross interstitial   or alveolar pulmonary edema. Cardiac and mediastinal structures unchanged noting   elongated thoracic aorta with atherosclerosis. Left-sided skin fold. No   pneumothorax or sizable pleural effusion. Medical Decision Making   I am the first provider for this patient. I reviewed the vital signs, available nursing notes, past medical history, past surgical history, family history and social history. Vital Signs-Reviewed the patient's vital signs. Patient Vitals for the past 12 hrs:   Temp Pulse Resp BP SpO2   11/16/20 1824  93 16 (!) 183/110 97 %   11/16/20 1710  87 17 (!) 173/86 99 %   11/16/20 1423  80 16 (!) 143/101 99 %   11/16/20 1106 98.6 °F (37 °C) 91 17 127/68 97 %       Records Reviewed: Nursing Notes        Provider Notes (Medical Decision Making):   Patient presents to the emergency department today for confusion. Her daughter states that last night she noticed that she had malodorous urine and thought that she might have a urinary tract infection. In addition, she was acting somewhat confused and agitated and was hallucinating. Patient did initially have a urinalysis on arrival which was not consistent with a urinary tract infection. I then expanded the patient's work-up. Her EKG was obtained which revealed atrial fibrillation with controlled rate. The patient is noted to have a history of atrial fibrillation.   She also had nonspecific ST changes it appears in the inferior and lateral leads. She had no STEMI. Troponin was noted to be elevated at 0.10. CT the head without contrast was also initially ordered which revealed periventricular/subcortical gliosis related to nonacute and vessel occlusive change. Similar findings compared to CT of the head 9/21/2020 no intracranial hemorrhage. Chest x-ray was obtained which revealed unchanged coarse reticular markings throughout the lungs. No gross interstitial or alveolar pulmonary edema. Was given aspirin here in the emergency department. I felt that given the elevated troponin and the nonspecific ST changes on the EKG, that the patient should be admitted for further evaluation. There are no beds currently at [facility]. I then spoke with the hospitalist on-call Dr. Jennie Gordon at [facility] whose has accepted the patient. He wanted me to touch base with the cardiologist on-call Dr. Rik Leyden and he is aware the patient. The patient is transferred in stable condition. [facility]         ED Course:   Initial assessment performed. The patients presenting problems have been discussed, and they are in agreement with the care plan formulated and outlined with them. I have encouraged them to ask questions as they arise throughout their visit. PROCEDURES  Procedures           Return to ED if worse     Diagnosis     Clinical Impression:   1. NSTEMI (non-ST elevated myocardial infarction) ([facility] [address]    2.  Altered mental status, unspecified altered mental status type

## 2020-11-17 ENCOUNTER — APPOINTMENT (OUTPATIENT)
Dept: MRI IMAGING | Age: 85
DRG: 884 | End: 2020-11-17
Attending: INTERNAL MEDICINE
Payer: MEDICARE

## 2020-11-17 ENCOUNTER — APPOINTMENT (OUTPATIENT)
Dept: CT IMAGING | Age: 85
DRG: 884 | End: 2020-11-17
Attending: INTERNAL MEDICINE
Payer: MEDICARE

## 2020-11-17 LAB
ALBUMIN SERPL-MCNC: 3.6 G/DL (ref 3.5–5)
ALBUMIN SERPL-MCNC: 3.7 G/DL (ref 3.5–5)
ALBUMIN/GLOB SERPL: 1.4 {RATIO} (ref 1.1–2.2)
ALBUMIN/GLOB SERPL: 1.5 {RATIO} (ref 1.1–2.2)
ALP SERPL-CCNC: 56 U/L (ref 45–117)
ALP SERPL-CCNC: 61 U/L (ref 45–117)
ALT SERPL-CCNC: 11 U/L (ref 12–78)
ALT SERPL-CCNC: 9 U/L (ref 12–78)
AMMONIA PLAS-SCNC: 11 UMOL/L
AMMONIA PLAS-SCNC: 26 UMOL/L
ANION GAP SERPL CALC-SCNC: 7 MMOL/L (ref 5–15)
ANION GAP SERPL CALC-SCNC: 8 MMOL/L (ref 5–15)
AST SERPL-CCNC: 15 U/L (ref 15–37)
AST SERPL-CCNC: 17 U/L (ref 15–37)
ATRIAL RATE: 78 BPM
BASOPHILS # BLD: 0.1 K/UL (ref 0–0.1)
BASOPHILS # BLD: NORMAL K/UL
BASOPHILS NFR BLD: 1 % (ref 0–1)
BASOPHILS NFR BLD: NORMAL % (ref 0–1)
BILIRUB SERPL-MCNC: 0.7 MG/DL (ref 0.2–1)
BILIRUB SERPL-MCNC: 0.8 MG/DL (ref 0.2–1)
BUN SERPL-MCNC: 13 MG/DL (ref 6–20)
BUN SERPL-MCNC: 15 MG/DL (ref 6–20)
BUN/CREAT SERPL: 25 (ref 12–20)
BUN/CREAT SERPL: ABNORMAL (ref 12–20)
CALCIUM SERPL-MCNC: 9.5 MG/DL (ref 8.5–10.1)
CALCIUM SERPL-MCNC: 9.7 MG/DL (ref 8.5–10.1)
CALCULATED R AXIS, ECG10: 33 DEGREES
CALCULATED T AXIS, ECG11: 0 DEGREES
CHLORIDE SERPL-SCNC: 108 MMOL/L (ref 97–108)
CHLORIDE SERPL-SCNC: 110 MMOL/L (ref 97–108)
CHOLEST SERPL-MCNC: 150 MG/DL
CHOLEST SERPL-MCNC: 159 MG/DL
CO2 SERPL-SCNC: 21 MMOL/L (ref 21–32)
CO2 SERPL-SCNC: 24 MMOL/L (ref 21–32)
COMMENT, HOLDF: NORMAL
CREAT SERPL-MCNC: 0.52 MG/DL (ref 0.55–1.02)
CREAT SERPL-MCNC: <0.15 MG/DL (ref 0.55–1.02)
DIAGNOSIS, 93000: NORMAL
DIFFERENTIAL METHOD BLD: NORMAL
DIFFERENTIAL METHOD BLD: NORMAL
EOSINOPHIL # BLD: 0.2 K/UL (ref 0–0.4)
EOSINOPHIL # BLD: NORMAL K/UL
EOSINOPHIL NFR BLD: 3 % (ref 0–7)
EOSINOPHIL NFR BLD: NORMAL % (ref 0–5)
ERYTHROCYTE [DISTWIDTH] IN BLOOD BY AUTOMATED COUNT: 13.2 % (ref 11.5–14.5)
ERYTHROCYTE [DISTWIDTH] IN BLOOD BY AUTOMATED COUNT: NORMAL % (ref 11.5–14.5)
FOLATE SERPL-MCNC: 18.9 NG/ML (ref 5–21)
FOLATE SERPL-MCNC: 20.7 NG/ML (ref 5–21)
GLOBULIN SER CALC-MCNC: 2.4 G/DL (ref 2–4)
GLOBULIN SER CALC-MCNC: 2.6 G/DL (ref 2–4)
GLUCOSE SERPL-MCNC: 88 MG/DL (ref 65–100)
GLUCOSE SERPL-MCNC: 89 MG/DL (ref 65–100)
HCT VFR BLD AUTO: 37 % (ref 35–47)
HCT VFR BLD AUTO: NORMAL %
HDLC SERPL-MCNC: 61 MG/DL
HDLC SERPL-MCNC: 65 MG/DL
HDLC SERPL: 2.4 {RATIO} (ref 0–5)
HDLC SERPL: 2.5 {RATIO} (ref 0–5)
HGB BLD-MCNC: 12 G/DL (ref 11.5–16)
HGB BLD-MCNC: NORMAL G/DL (ref 12–16)
IMM GRANULOCYTES # BLD AUTO: 0 K/UL (ref 0–0.04)
IMM GRANULOCYTES # BLD AUTO: NORMAL K/UL (ref 0–0.5)
IMM GRANULOCYTES NFR BLD AUTO: 0 % (ref 0–0.5)
IMM GRANULOCYTES NFR BLD AUTO: NORMAL % (ref 0–5)
LDLC SERPL CALC-MCNC: 70.2 MG/DL (ref 0–100)
LDLC SERPL CALC-MCNC: 73.2 MG/DL (ref 0–100)
LIPID PROFILE,FLP: NORMAL
LIPID PROFILE,FLP: NORMAL
LYMPHOCYTES # BLD: 1.7 K/UL (ref 0.8–3.5)
LYMPHOCYTES # BLD: NORMAL K/UL
LYMPHOCYTES NFR BLD: 24 % (ref 12–49)
LYMPHOCYTES NFR BLD: NORMAL % (ref 12–49)
MAGNESIUM SERPL-MCNC: 2 MG/DL (ref 1.6–2.4)
MAGNESIUM SERPL-MCNC: <0.3 MG/DL (ref 1.6–2.4)
MCH RBC QN AUTO: 31.6 PG (ref 26–34)
MCH RBC QN AUTO: NORMAL PG (ref 25–35)
MCHC RBC AUTO-ENTMCNC: 32.4 G/DL (ref 30–36.5)
MCHC RBC AUTO-ENTMCNC: NORMAL G/DL (ref 31–37)
MCV RBC AUTO: 97.4 FL (ref 80–99)
MCV RBC AUTO: NORMAL FL (ref 78–102)
MONOCYTES # BLD: 0.7 K/UL (ref 0–1)
MONOCYTES # BLD: NORMAL K/UL
MONOCYTES NFR BLD: 10 % (ref 5–13)
MONOCYTES NFR BLD: NORMAL % (ref 5–13)
NEUTS SEG # BLD: 4.3 K/UL (ref 1.8–8)
NEUTS SEG # BLD: NORMAL K/UL
NEUTS SEG NFR BLD: 62 % (ref 32–75)
NEUTS SEG NFR BLD: NORMAL % (ref 42–75)
NRBC # BLD: 0 K/UL (ref 0–0.01)
NRBC # BLD: NORMAL K/UL
NRBC BLD-RTO: 0 PER 100 WBC
NRBC BLD-RTO: NORMAL PER 100 WBC
PHOSPHATE SERPL-MCNC: 2.7 MG/DL (ref 2.6–4.7)
PHOSPHATE SERPL-MCNC: <0.5 MG/DL (ref 2.6–4.7)
PLATELET # BLD AUTO: 195 K/UL (ref 150–400)
PLATELET # BLD AUTO: NORMAL K/UL
PMV BLD AUTO: 9.9 FL (ref 8.9–12.9)
PMV BLD AUTO: NORMAL FL (ref 7.4–10.4)
POTASSIUM SERPL-SCNC: 3.8 MMOL/L (ref 3.5–5.1)
POTASSIUM SERPL-SCNC: 3.8 MMOL/L (ref 3.5–5.1)
PROT SERPL-MCNC: 6.1 G/DL (ref 6.4–8.2)
PROT SERPL-MCNC: 6.2 G/DL (ref 6.4–8.2)
Q-T INTERVAL, ECG07: 380 MS
QRS DURATION, ECG06: 86 MS
QTC CALCULATION (BEZET), ECG08: 452 MS
RBC # BLD AUTO: 3.8 M/UL (ref 3.8–5.2)
RBC # BLD AUTO: NORMAL M/UL
SAMPLES BEING HELD,HOLD: NORMAL
SODIUM SERPL-SCNC: 138 MMOL/L (ref 136–145)
SODIUM SERPL-SCNC: 140 MMOL/L (ref 136–145)
TRIGL SERPL-MCNC: 104 MG/DL (ref ?–150)
TRIGL SERPL-MCNC: 94 MG/DL (ref ?–150)
TROPONIN I SERPL-MCNC: 0.11 NG/ML
TROPONIN I SERPL-MCNC: 0.11 NG/ML
TSH SERPL DL<=0.05 MIU/L-ACNC: 1.01 UIU/ML (ref 0.36–3.74)
TSH SERPL DL<=0.05 MIU/L-ACNC: 1.16 UIU/ML (ref 0.36–3.74)
VENTRICULAR RATE, ECG03: 85 BPM
VIT B12 SERPL-MCNC: 439 PG/ML (ref 193–986)
VIT B12 SERPL-MCNC: 471 PG/ML (ref 193–986)
VLDLC SERPL CALC-MCNC: 18.8 MG/DL
VLDLC SERPL CALC-MCNC: 20.8 MG/DL
WBC # BLD AUTO: 7 K/UL (ref 3.6–11)
WBC # BLD AUTO: NORMAL K/UL

## 2020-11-17 PROCEDURE — 84443 ASSAY THYROID STIM HORMONE: CPT

## 2020-11-17 PROCEDURE — 74011250637 HC RX REV CODE- 250/637: Performed by: INTERNAL MEDICINE

## 2020-11-17 PROCEDURE — 36415 COLL VENOUS BLD VENIPUNCTURE: CPT

## 2020-11-17 PROCEDURE — 84484 ASSAY OF TROPONIN QUANT: CPT

## 2020-11-17 PROCEDURE — 82747 ASSAY OF FOLIC ACID RBC: CPT

## 2020-11-17 PROCEDURE — 83735 ASSAY OF MAGNESIUM: CPT

## 2020-11-17 PROCEDURE — 99222 1ST HOSP IP/OBS MODERATE 55: CPT | Performed by: INTERNAL MEDICINE

## 2020-11-17 PROCEDURE — 80061 LIPID PANEL: CPT

## 2020-11-17 PROCEDURE — 82746 ASSAY OF FOLIC ACID SERUM: CPT

## 2020-11-17 PROCEDURE — 84100 ASSAY OF PHOSPHORUS: CPT

## 2020-11-17 PROCEDURE — 82607 VITAMIN B-12: CPT

## 2020-11-17 PROCEDURE — 74011250637 HC RX REV CODE- 250/637: Performed by: HOSPITALIST

## 2020-11-17 PROCEDURE — 72125 CT NECK SPINE W/O DYE: CPT

## 2020-11-17 PROCEDURE — 82140 ASSAY OF AMMONIA: CPT

## 2020-11-17 PROCEDURE — 80053 COMPREHEN METABOLIC PANEL: CPT

## 2020-11-17 PROCEDURE — 65660000000 HC RM CCU STEPDOWN

## 2020-11-17 PROCEDURE — 85025 COMPLETE CBC W/AUTO DIFF WBC: CPT

## 2020-11-17 PROCEDURE — 72192 CT PELVIS W/O DYE: CPT

## 2020-11-17 RX ORDER — HYDRALAZINE HYDROCHLORIDE 20 MG/ML
5 INJECTION INTRAMUSCULAR; INTRAVENOUS
Status: DISCONTINUED | OUTPATIENT
Start: 2020-11-17 | End: 2020-11-19 | Stop reason: HOSPADM

## 2020-11-17 RX ORDER — DULOXETIN HYDROCHLORIDE 20 MG/1
20 CAPSULE, DELAYED RELEASE ORAL
Status: DISCONTINUED | OUTPATIENT
Start: 2020-11-17 | End: 2020-11-19 | Stop reason: HOSPADM

## 2020-11-17 RX ADMIN — PANTOPRAZOLE SODIUM 40 MG: 40 TABLET, DELAYED RELEASE ORAL at 07:06

## 2020-11-17 RX ADMIN — Medication 10 ML: at 21:08

## 2020-11-17 RX ADMIN — ACETAMINOPHEN 650 MG: 325 TABLET ORAL at 10:10

## 2020-11-17 RX ADMIN — DABIGATRAN ETEXILATE MESYLATE 75 MG: 75 CAPSULE ORAL at 09:30

## 2020-11-17 RX ADMIN — ATORVASTATIN CALCIUM 40 MG: 40 TABLET, FILM COATED ORAL at 21:08

## 2020-11-17 RX ADMIN — LEVOTHYROXINE SODIUM 25 MCG: 0.03 TABLET ORAL at 07:06

## 2020-11-17 RX ADMIN — LISINOPRIL 20 MG: 20 TABLET ORAL at 10:10

## 2020-11-17 RX ADMIN — METOPROLOL TARTRATE 50 MG: 50 TABLET, FILM COATED ORAL at 21:08

## 2020-11-17 RX ADMIN — DABIGATRAN ETEXILATE MESYLATE 75 MG: 75 CAPSULE ORAL at 02:13

## 2020-11-17 RX ADMIN — METOPROLOL TARTRATE 50 MG: 50 TABLET, FILM COATED ORAL at 10:10

## 2020-11-17 RX ADMIN — Medication 10 ML: at 07:07

## 2020-11-17 RX ADMIN — ATORVASTATIN CALCIUM 40 MG: 40 TABLET, FILM COATED ORAL at 02:13

## 2020-11-17 RX ADMIN — DULOXETINE 20 MG: 20 CAPSULE, DELAYED RELEASE ORAL at 21:08

## 2020-11-17 RX ADMIN — Medication 10 ML: at 02:13

## 2020-11-17 NOTE — ACP (ADVANCE CARE PLANNING)
Advance Care Planning     Advance Care Planning Activator (Inpatient)  Conversation Note      Date of ACP Conversation: 11/17/20     Conversation Conducted with:   Patient with Decision Making Capacity and Healthcare Decision Maker: Next of Kin by law (only applies in absence of a Healthcare Power of  or Legal Guardian)    ACP Activator: Richrd Mortimer, RN    *When [employer] makes decisions on behalf of the incapacitated patient: Decision Maker is asked to consider and make decisions based on patient values, known preferences, or best interests. Health Care Decision Maker:    Current Designated Health Care Decision Maker:   Primary Decision Maker: Tara Alves - [phone]  (If there is a [address] named in the [address]\" box in the ACP activity, but it is not visible above, be sure to open that field and then select the health care decision maker relationship (ie \"primary\") in the blank space to the right of the name.) Validate  this information as still accurate & up-to-date; edit [employer] field as needed.)    Note: Assess and validate information in current ACP documents, as indicated. If no Decision Maker listed above or available through scanned documents, then:    If no Authorized Decision Maker has previously been identified, then patient chooses Health Care Decision Maker:    Note: If the relationship of these Decision-Makers to the patient does NOT follow your state's Next of Kin hierarchy, recommend that patient complete ACP document that meets state-specific requirements to allow them to act on the patient's behalf when appropriate. Care Preferences    Ventilation: \"If you were in your present state of health and suddenly became very ill and were unable to breathe on your own, what would your preference be about the use of a ventilator (breathing machine) if it were available to you? \"      If patient would desire the use of a ventilator (breathing machine), answer \"yes\", if not \"no\":no    \"If your health worsens and it becomes clear that your chance of recovery is unlikely, what would your preference be about the use of a ventilator (breathing machine) if it were available to you? \"     Would the patient desire the use of a ventilator (breathing machine)? NO      Resuscitation  \"CPR works best to restart the heart when there is a sudden event, like a heart attack, in someone who is otherwise healthy. Unfortunately, CPR does not typically restart the heart for people who have serious health conditions or who are very sick. \"    \"In the event your heart stopped as a result of an underlying serious health condition, would you want attempts to be made to restart your heart (answer \"yes\" for attempt to resuscitate) or would you prefer a natural death (answer \"no\" for do not attempt to resuscitate)? \" no      NOTE: If the patient has a valid advance directive AND now provides care preference(s) that are inconsistent with that prior directive, advise the patient to consider either: creating a new advance directive that complies with state-specific requirements; or, if that is not possible, orally revoking that prior directive in accordance with state-specific requirements, which must be documented in the EHR. [] Yes  [] No   Educated Patient / Cal Espinosa regarding differences between Advance Directives and portable DNR orders.     Length of ACP Conversation in minutes:      Conversation Outcomes:  [] ACP discussion completed  [] Existing advance directive reviewed with patient; no changes to patient's previously recorded wishes     [] New Advance Directive completed   [] Portable Do Not Resuscitate prepared for Provider review and signature  [] POLST/POST/MOLST/MOST prepared for Provider review and signature      Follow-up plan:    [] Schedule follow-up conversation to continue planning  [] Referred individual to Provider for additional questions/concerns   [] Advised patient/agent/surrogate to review completed ACP document and update if needed with changes in condition, patient preferences or care setting     [] This note routed to one or more involved healthcare providers

## 2020-11-17 NOTE — PROGRESS NOTES
Hospitalist [employer] Cover[employer] NP     Name: Lamar Patel  YOB: 1933  MRN: [id number]  Admission Date: 11/16/2020  8:59 PM    Date of service: 11/17/2020 6:23 AM                                Overnight update:        Paged by RN for abnormal AM labs - appear grossly diluted or lab error  - Redraw sent and in samples being rerun     [employer] FNP-C, PA-C  [phone] or TigerText

## 2020-11-17 NOTE — PROGRESS NOTES
Rec'd bedside report,  Patient is in bed, alert x 2 skin warm to touch, resp easy and unlabored. Sitting up in bed voiced no C/O at present. Patient setup for dinner, stated that she could feed her self. Managing well,  assisted as needed. Resting in bed,  At present. Report given to oncoming shift all question asked and answered.

## 2020-11-17 NOTE — PROGRESS NOTES
GLORIA:    RUR 12%    Patient lives with her Daughter, Hillary Stovall. Anticipated home with daughter at discharge. Care Management Interventions  PCP Verified by CM: Yes  Palliative Care Criteria Met (RRAT>21 & CHF Dx)?: No  Mode of Transport at Discharge: (DaughterHillary to transport.)  MyChart Signup: No  Discharge Durable Medical Equipment: No  Physical Therapy Consult: Yes  Occupational Therapy Consult: Yes  Speech Therapy Consult: No  Current Support Network: Other(Lives with daughterHillary.)  Confirm Follow Up Transport: Family  Discharge Location  Discharge Placement: Home with family assistance    Reason for Admission:  Acute delirium                    RUR Score:    12%                 Plan for utilizing home health:    No orders      PCP: First and Last name:  Johnnie Kocher   Name of Practice:    Are you a current patient: Yes/No: Yes   Approximate date of last visit: 4 months ago   Can you participate in a virtual visit with your PCP: Yes                    Current Advanced Directive/Advance Care Plan:                    Per patient's daughter, patient should be DNR. CM sent perfect serve to attending. Transition of Care Plan:     CM spoke with patient's daughter to introduce CM role, verify demographics and begin discharge planning. Patient lives with her daughter, Hillary Stovall. She gets prescriptions filled at [facility]. Patient has a walker, Cane and rollater at home. She has gone to SNF rehab and used [facility] in the past, however patient's daughter could not recall names of companies used. Insurance verified: [place] Medicare A&B primary and [employer] secondary.     David Granda RN/CRM

## 2020-11-17 NOTE — H&P
[facility]  HISTORY AND PHYSICAL    Name:  Travis Dee  MR#:  [id number]  :  1933  ACCOUNT #:  [de-identified]  ADMIT DATE:  2020      The patient was seen, evaluated and admitted by me on 2020. PRIMARY CARE PROVIDER:  Tanvir Rob MD    SOURCE OF INFORMATION:  The patient who is not a good historian, review of ED and old medical records. CHIEF COMPLAINT:  Confusion. HISTORY OF PRESENT ILLNESS:  This is an 59-year-old woman with a past medical history significant for suspected dementia, chronic atrial fibrillation on Pradaxa for anticoagulation, dyslipidemia, hypertension, hypothyroidism, anxiety/depression, was in her usual state of health until a couple of days ago when the patient developed confusion in the form of hallucination at nighttime. The patient also has weakness, described as generalized weakness. According to report, the patient's daughter thought that the patient's urine is foul smelling and suspected that the patient has urinary tract infection, which has caused the patient to be confused in the past.  The patient's urologist and PCP were called for an appointment, but the patient cannot be seen right away by PCP and urologist.  Because of that, the patient was taken to the emergency room at the local hospital where the patient resides. It was also reported that the patient has not taken her medications for the past 6 months. The daughter, according to the report, is aware of the patient's suspected underlying dementia, although the patient has not been officially diagnosed with dementia. When the patient arrived at the emergency room of the local hospital, the patient was found to have elevated troponin level.   The patient was discussed with the cardiologist on-call at [facility].  The hospitalist service was asked to directly admit the patient to [facility] so that the patient can be evaluated by the cardiologist and also for further evaluation of the patient's confusion. There was no fever, rigor or chills reported. The patient was last admitted to the hospital from 11/17/2014 to 11/20/2014. The patient was admitted for elective total hip arthroplasty secondary to osteoarthritis by the orthopedic service. The patient is a very poor historian because of suspected underlying dementia. The patient stated that as a result of her generalized weakness, she is falling at home a couple of times. The patient cannot state whether the fall was associated with loss of consciousness or head injury. PAST MEDICAL HISTORY:  Chronic atrial fibrillation, dyslipidemia, hypertension, hypothyroidism, anxiety/depression, suspected dementia. ALLERGIES:  THE PATIENT IS ALLERGIC TO MACROBID, CODEINE, LIDOCAINE, AND MORPHINE. MEDICATIONS:  Pradaxa 75 mg twice daily, oxycodone IR 5 mg 1-2 tablets every 3 hours as needed for pain, MiraLax 17 g daily, Zocor 80 mg daily, Antivert 25 mg as needed for dizziness, Cymbalta 60 mg daily at bedtime, Lopressor 50 mg twice daily, Synthroid 25 mcg daily, Xanax 0.25 mg daily as needed for anxiety, Dexilant 60 mg daily, lisinopril 20 mg daily. FAMILY HISTORY:  This was reviewed. Her sister had breast cancer. PAST SURGICAL HISTORY:  This is significant for bilateral cataract extraction, cholecystectomy, right total hip replacement, tonsillectomy, hemorrhoidectomy. SOCIAL HISTORY:  The patient is a former smoker, quit tobacco abuse in 11/1991. No history of alcohol abuse. REVIEW OF SYSTEMS:  Unable to obtain because of the patient's mental status. PHYSICAL EXAMINATION:  GENERAL APPEARANCE:  The patient appeared ill, in moderate distress. VITAL SIGNS:  On arrival at the emergency room, temperature 98.6, pulse 91, respiratory rate 17, blood pressure 127/68, oxygen saturation 97%. HEENT:  Head:  Normocephalic, atraumatic. Eyes:  Normal eye movement. No redness, no drainage, no discharge.   Ears: Normal external ears with no evidence of drainage. Nose:  No deformity, no drainage. Mouth and Throat:  No visible oral lesion. Dry oral mucosa. NECK:  Neck is supple. No JVD. Mild tenderness, back of the neck. CHEST:  Clear breath sounds. No wheezing, no crackles. HEART:  Normal S1 and S2. Irregularly irregular. No clinically appreciable murmur. ABDOMEN:  Soft, nontender. Normal bowel sounds. CNS:  Alert, oriented to person. No gross focal neurological deficit. EXTREMITIES:  No edema. Pulses 2+ bilaterally. MUSCULOSKELETAL SYSTEM:  No evidence of joint deformity or swelling. SKIN:  No active skin lesions seen in the exposed part of the body. PSYCHIATRY:  Unable to assess mood and affect. LYMPHATIC SYSTEM:  No cervical lymphadenopathy. DIAGNOSTIC DATA:  EKG shows atrial fibrillation and nonspecific ST and T-wave abnormalities. CT scan of the head without contrast, no acute pathology. Chest x-ray shows unchanged coarse reticular markings throughout the lungs, no gross interstitial or alveolar pulmonary edema, cardiac and mediastinal structures are unchanged. LABORATORY DATA:  Urinalysis: This is significant for negative nitrite, negative leukocyte esterase, negative bacteria, trace blood. Hematology:  WBC 6.9, hemoglobin 11.9, hematocrit 37.6, platelets 547. Chemistry:  Sodium 140, potassium 4.0, chloride 106, CO2 27, glucose 99, BUN 23, creatinine 0.87, calcium 9.9, total bilirubin 0.5, AST 18, ALT 13, alkaline phosphatase 60, total protein 6.8, albumin level 3.7, globulin 3.1. Cardiac profile:  Troponin 0.10. ASSESSMENT:  1. Acute delirium. 2.  Suspected dementia. 3.  Suspected non-ST-elevation myocardial infarction. 4.  Persistent atrial fibrillation. 5.  Dyslipidemia. 6.  Hypertension. 7.  Hypothyroidism. 8.  Anxiety/depression. 9.  Fall at home. PLAN:  1. Acute delirium. We will admit the patient for further evaluation and treatment.   We will identify and treat underlying etiological factors. CT scan of the head did not show any acute pathology. Urinalysis is negative. Chest x-ray did not show any evidence of infection. We will obtain MRI of the brain to evaluate the patient for stroke as a possible cause of confusion. We will monitor the patient closely. The patient may require Neurology consult if the MRI of the brain is positive for stroke. We will check a TSH level. 2.  Suspected dementia. We will check H31 and folic acid level. 3.  Suspected non-ST-elevation myocardial infarction. The patient is on Pradaxa. Because of that, we will not start the patient on full-dose heparin. We will continue with Lopressor. We will also start the patient on aspirin. We will check echocardiogram.  We will await further recommendation from the cardiologist consulted by the emergency room physician. 4.  Persistent atrial fibrillation. We will continue with Lopressor for rate control and Pradaxa for anticoagulation. 5.  Dyslipidemia. We will continue with Lipitor. 6.  Hypertension. We will resume preadmission medication and monitor the patient's blood pressure closely. 7.  Hypothyroidism. We will continue with Synthroid and check a TSH level. 8.  Anxiety/depression. We will continue with preadmission medication. 9.  Fall at home. CT scan of the head is negative for acute pathology. We will check CT scan of the cervical spine and pelvis to evaluate the patient for fracture. OTHER ISSUES:  Code status: The patient is a full code. The patient is on Pradaxa. Because of that, there is no need for DVT prophylaxis. FUNCTIONAL STATUS PRIOR TO ADMISSION:  The patient came from home. The patient is ambulatory with a walker. COVID PRECAUTION:  The patient was wearing a face mask. I was wearing a face mask and gloves for this patient's encounter.         MD DEMARCO [email or web address]  D:  11/17/2020 5:06  T:  11/17/2020 6:33  JOB #: [id number]  CC:  Alma Lombardi MD Suture Removal: 14 days

## 2020-11-17 NOTE — CONSULTS
Cardiology Consult Note      Patient Name: Rachel Gates  : 3/3/1933 MRN: [id number]  Date: 2020  Time: 10:17 AM    Admit Diagnosis: Acute delirium [R41.0]    Primary Cardiologist: Dr. Helder Turpin MD   Consulting Cardiologist: Dipesh Marmolejo. Crista Dykes M.D.   Chacho Winter for Consult: elevated troponin  Requesting MD: Dr. June Alanis MD    HPI:  Rachel Gates is a 80 y.o. female admitted on 2020  for Acute delirium [R41.0]. Has PMH of HTN, HLD, chronic afib NOT on [address],  GERD, hypothyroidism, confusion. , Rt hip RADHA (). Brought to OSH ER by daughter with chief c/o hallucinations and concern for possible UTI. Has some confusion and possible dementia at baseline. Has some history of falls at baseline. Cardiology consulted as troponin was drawn (for unknown reason) and was mildly elevated, trend is subsequently flat. Spoke with daughter who has lived with pt for approximately 1 year. Pt follows with Dr. Helder Turpin for afib. No other history of cardiac disease. No history of MI, CAD, cardiac cath or stress testing. Had recent holter monitor with unknown results. Her cardiologist took pt off Pradaxa due to falls- pt falls about every 10-14 days per daughter report. He stopped one of her BP meds but pt has for past 6 months not taken any bp meds because she felt they made her dizzy. Daughter says pt is dizzy despite being off meds. Daughter states pt has no history of chest pain, SOB, palpitations and pt confirms same. 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 Of note: daughter convinced that pt has UTI- states her Nolene Patter never shows it but pt gets more confused when she has UTI\". Does sundown at baseline. SH: former smoker, no ETOH use  FH: sister with breast cancer. Subjective:  Denies chest pain, SOB, dizziness, lightheadedness. Assessment and Plan     1.  Elevated troponin: troponin mildly elevated but flat trend   -0.11-0.1   -no chest pain   -EKG NSR with lateral ST abnormality unchanged from 2014.    -Suspect troponin elevated due to demand of hypertensive urgency    2. History of Afib, chronic   -Currently afib, rate controlled   -continue Lopressor   -CBL3mg5hbll=2. Not on [address] PTA per her cardiologist due to fall risk. Defer to her primary cardiologist but agree she is high fall risk and would not recommend [address]. 3. Hypertensive urgency: BP elevated. -Continue lopressor, Lisinopril   -PRN hydralazine. 4. HLD: LDL 70, HDL 61   -On Simvastatin PTA (atorvastatin here)      80 y.o. female with PMH of HTN, afib, CVA presents with chief c/o of hallucinations per daughter report with daughters concern for UTI. No history of coronary disease or MI. No history of chest pain. EKG with no new changes. Troponin elevation mild and trend flat not consistent with NSTEMI. Suspect troponin elevation due to demand of uncontrolled BP. Would not restart Pradaxa as daughter states her cardiologist took her off the [address] due to fall risk. Follow up with her cardiologist after discharge. No further cardiac testing needed. Saw and evaluated pt and agree with above assessment and plan. Troponin is trivially elevated and flat. No current or recent cardiac symptoms. Elevation is secondary to demand/HTN, etc. No additional cardiac evaluation indicated at this time. Will be available prn. Outpt follow up with pts primary cardiologist.  Torie Hunter MD      Review of Symptoms:  Cardiovascular ROS: no chest pain or palpitations  Respiratory ROS: no SOB or cough  Neurological ROS: LLE weakness (old)  : no dysuria or hematuria  GI: no blood in stool or black stools no abdominal pain  All other systems negative except as above. Previous treatment/evaluation includes holter . Cardiac risk factors: smoking/ tobacco exposure, dyslipidemia, sedentary life style, hypertension.     Past Medical History:   Diagnosis Date    Arrhythmia     atrial fib    Arthritis     Chronic pain     In back and right foot    GERD (gastroesophageal reflux disease)     Hypercholesterolemia     Hypertension     Other ill-defined conditions(799.89)     right ventricular \"thickening\" of heart    Other ill-defined conditions(799.89)     blood transfusion hx--2012    Psychiatric disorder     depression    Thyroid disease     Unspecified adverse effect of anesthesia     Itching     Past Surgical History:   Procedure Laterality Date    HX CATARACT REMOVAL      bilateral    HX OPEN CHOLECYSTECTOMY      HX ORTHOPAEDIC      Right Foot Surgery x 6    HX ORTHOPAEDIC      plate in foot    HX ORTHOPAEDIC      HX ORTHOPAEDIC  3/12/12     RIGHT TOTAL HIP REPLACEMENT    HX OTHER SURGICAL      hemorrhoidectomy    HX TONSILLECTOMY       Current Facility-Administered Medications   Medication Dose Route Frequency    hydrALAZINE (APRESOLINE) 20 mg/mL injection 5 mg  5 mg IntraVENous Q6H PRN    ALPRAZolam (XANAX) tablet 0.25 mg  0.25 mg Oral DAILY PRN    [Held by provider] dabigatran etexilate (PRADAXA) capsule 75 mg  75 mg Oral Q12H    pantoprazole (PROTONIX) tablet 40 mg  40 mg Oral ACB    DULoxetine (CYMBALTA) capsule 60 mg  60 mg Oral QHS    levothyroxine (SYNTHROID) tablet 25 mcg  25 mcg Oral ACB    lisinopriL (PRINIVIL, ZESTRIL) tablet 20 mg  20 mg Oral 7am    meclizine (ANTIVERT) tablet 25 mg  25 mg Oral DAILY PRN    metoprolol tartrate (LOPRESSOR) tablet 50 mg  50 mg Oral BID    oxyCODONE IR (ROXICODONE) tablet 5-10 mg  5-10 mg Oral Q3H PRN    atorvastatin (LIPITOR) tablet 40 mg  40 mg Oral QHS    sodium chloride (NS) flush 5-40 mL  5-40 mL IntraVENous Q8H    sodium chloride (NS) flush 5-40 mL  5-40 mL IntraVENous PRN    acetaminophen (TYLENOL) tablet 650 mg  650 mg Oral Q6H PRN    Or    acetaminophen (TYLENOL) suppository 650 mg  650 mg Rectal Q6H PRN    polyethylene glycol (MIRALAX) packet 17 g  17 g Oral DAILY PRN    promethazine (PHENERGAN) tablet 12.5 mg  12.5 mg Oral Q6H PRN    Or    ondansetron (ZOFRAN) injection 4 mg  4 mg IntraVENous Q6H PRN       Allergies   Allergen Reactions    Macrobid [Nitrofurantoin Monohyd/M-Cryst] Other (comments)     Needed to be placed on life support after taking with coumadin    Codeine Nausea Only    Lidocaine Itching    Morphine Itching      Family History   Problem Relation Age of Onset    Breast Cancer Sister 61    Cancer Sister         breast cancer      Social History     Socioeconomic History    Marital status:      Spouse name: Not on file    Number of children: Not on file    Years of education: Not on file    Highest education level: Not on file   Tobacco Use    Smoking status: Former Smoker     Packs/day: 1.00     Years: 5.00     Pack years: 5.00     Last attempt to quit: 11/10/1991     Years since quittin.0    Smokeless tobacco: Never Used   Substance and Sexual Activity    Alcohol use: Never     Frequency: Never    Drug use: Never   Social History Narrative    ** Merged History Encounter **            Objective:    Physical Exam    Vitals:   Vitals:    20 2301 20 0425 20 0710 20 0941   BP: (!) 174/77 (!) 165/81 (!) 160/80 (!) 209/84   Pulse: 82 74 82 89   Resp: 15 16 18    Temp: 98.4 °F (36.9 °C) 97.5 °F (36.4 °C) 98.5 °F (36.9 °C)    SpO2: 97% 96% 96%    Weight:  116 lb (52.6 kg)         General:    Alert, cooperative, no distress, appears stated age. Neck:   Supple,   Back:     Symmetric,    Lungs:     Clear to auscultation bilaterally. Heart[de-identified]    Regular rate and rhythm, S1, S2 normal, no murmur, click, rub or gallop. Abdomen:     Soft, non-tender. Bowel sounds normal. No masses,  No      organomegaly. Extremities:   Extremities normal, atraumatic, no cyanosis or edema.    Vascular:   Pulses - 2+   Skin:   Skin color normal. No rashes or lesions   Neurologic:   LOREDO, oriented to person, place, year        Telemetry: afib, rate controlled. ECG: Media Information                  Data Review:     Radiology:   XR Results (most recent):  Results from Hospital Encounter encounter on 11/16/20   XR CHEST PORT    Narrative Chest single view. Comparison single view chest October 27, 2019. Unchanged coarse reticular markings throughout the lungs. No gross interstitial  or alveolar pulmonary edema. Cardiac and mediastinal structures unchanged noting  elongated thoracic aorta with atherosclerosis. Left-sided skin fold. No  pneumothorax or sizable pleural effusion. CT Results (most recent):  Results from [place] encounter on 11/16/20   CT PELV WO CONT    Narrative INDICATION:  fall     EXAM: CT pelvis. Comparison 9/8/2020. Thin section axial images were obtained. From these sagittal and coronal  reformats were performed. CT dose reduction was achieved through use of a  standardized protocol tailored for this examination and automatic exposure  control for dose modulation. FINDINGS: Bones are osteopenic. Alignment is normal. There are bilateral hip  replacements. No evidence of periprosthetic fracture or loosening. Left  acetabular component is rotated posteriorly    There are vascular calcifications. Diverticulosis of the sigmoid colon without  evidence of acute diverticulitis There are chronic bilateral L5 pars defects  with significant degenerative changes at L4-5 and L5-S1    High density lobular lesion in the left adnexa measuring approximately 2.8 cm. May represent an ovary      Impression IMPRESSION:  1. No acute fracture  2. Chronic L5 pars defects  3. Question left adnexal lesion.  Consider pelvic ultrasound            Recent Labs     11/17/20  0331 11/16/20  2228 11/16/20  1245   TROIQ 0.11* 0.11* 0.10*     Recent Labs     11/17/20  0331 11/16/20  1245    140   K 3.8 4.0   * 106   CO2 21 27   BUN 15 23*   CREA <0.15* 0.87   GLU 88 99   PHOS <0.5*  --    CA 9.5 9.9     Recent Labs 11/17/20  0523 11/17/20  0331   WBC 7.0 SPECIMEN CLOTTED SUGGEST RECOLLECTION   HGB 12.0 SPECIMEN CLOTTED SUGGEST RECOLLECTION   HCT 37.0 SPECIMEN CLOTTED SUGGEST RECOLLECTION    SPECIMEN CLOTTED SUGGEST RECOLLECTION     Recent Labs     11/17/20  0331 11/16/20  1245   AP 56 60     Recent Labs     11/17/20  0331   CHOL 150   LDLC 70.2     Recent Labs     11/17/20  0523 11/17/20  0331   TSH 1.01 1.16       Thank you very much for this referral. I appreciate the opportunity to participate in this patient's care. I will follow along with above stated plan. Cheryl Lerma.  CARLOS A Oscar         [facility] [address] [place] [address] [address] [facility] [address]     Kathleen Herreramovenkatesh     [phone]    Prince Stevens MD

## 2020-11-17 NOTE — PROGRESS NOTES
Bedside shift change report given to Deja Flores (oncoming nurse) by Rena Perez (offgoing nurse). Report included the following information SBAR, Kardex, MAR, Recent Results, Med Rec Status and Cardiac Rhythm Afib. Problem: Falls - Risk of  Goal: Absence of Falls  Outcome: Progressing Towards Goal  Note: Fall Risk Interventions:  Mobility Interventions: Bed/chair exit alarm, PT Consult for mobility concerns, PT Consult for assist device competence, Utilize walker, cane, or other assistive device    Mentation Interventions: Adequate sleep, hydration, pain control, Bed/chair exit alarm, Door open when patient unattended, Evaluate medications/consider consulting pharmacy, More frequent rounding, Reorient patient, Room close to nurse's station    Elimination Interventions: Bed/chair exit alarm, Call light in reach      Problem: Afib Pathway: Day 1  Goal: Diagnostic Test, Stable cardiac rhythm, Labs within defined limits  Outcome: Progressing Towards Goal  Note- EKG- afib, rate controlled.  Troponin q6 hrs, 0.11    Problem: Hypertension  Goal: Blood pressure within specified parameters  Outcome: Progressing Towards Goal  Note- BP within defined limits, systolic <837

## 2020-11-18 ENCOUNTER — APPOINTMENT (OUTPATIENT)
Dept: MRI IMAGING | Age: 85
DRG: 884 | End: 2020-11-18
Attending: INTERNAL MEDICINE
Payer: MEDICARE

## 2020-11-18 LAB
ALBUMIN SERPL-MCNC: 3.7 G/DL (ref 3.5–5)
ALBUMIN SERPL-MCNC: 3.7 G/DL (ref 3.5–5)
ALBUMIN/GLOB SERPL: 1.5 {RATIO} (ref 1.1–2.2)
ALP SERPL-CCNC: 60 U/L (ref 45–117)
ALT SERPL-CCNC: 12 U/L (ref 12–78)
ANION GAP SERPL CALC-SCNC: 6 MMOL/L (ref 5–15)
APPEARANCE UR: CLEAR
AST SERPL-CCNC: 13 U/L (ref 15–37)
BACTERIA URNS QL MICRO: NEGATIVE /HPF
BASOPHILS # BLD: 0.1 K/UL (ref 0–0.1)
BASOPHILS NFR BLD: 1 % (ref 0–1)
BILIRUB DIRECT SERPL-MCNC: 0.2 MG/DL (ref 0–0.2)
BILIRUB SERPL-MCNC: 0.7 MG/DL (ref 0.2–1)
BILIRUB UR QL: NEGATIVE
BUN SERPL-MCNC: 13 MG/DL (ref 6–20)
BUN/CREAT SERPL: 19 (ref 12–20)
CALCIUM SERPL-MCNC: 9.8 MG/DL (ref 8.5–10.1)
CHLORIDE SERPL-SCNC: 108 MMOL/L (ref 97–108)
CO2 SERPL-SCNC: 26 MMOL/L (ref 21–32)
COLOR UR: ABNORMAL
CREAT SERPL-MCNC: 0.69 MG/DL (ref 0.55–1.02)
DIFFERENTIAL METHOD BLD: NORMAL
EOSINOPHIL # BLD: 0.2 K/UL (ref 0–0.4)
EOSINOPHIL NFR BLD: 3 % (ref 0–7)
EPITH CASTS URNS QL MICRO: ABNORMAL /LPF
ERYTHROCYTE [DISTWIDTH] IN BLOOD BY AUTOMATED COUNT: 12.9 % (ref 11.5–14.5)
FOLATE BLD-MCNC: 374 NG/ML
FOLATE RBC-MCNC: 1048 NG/ML
GLOBULIN SER CALC-MCNC: 2.5 G/DL (ref 2–4)
GLUCOSE SERPL-MCNC: 104 MG/DL (ref 65–100)
GLUCOSE UR STRIP.AUTO-MCNC: NEGATIVE MG/DL
HCT VFR BLD AUTO: 35.7 % (ref 34–46.6)
HCT VFR BLD AUTO: 37 % (ref 35–47)
HGB BLD-MCNC: 12 G/DL (ref 11.5–16)
HGB UR QL STRIP: NEGATIVE
IMM GRANULOCYTES # BLD AUTO: 0 K/UL (ref 0–0.04)
IMM GRANULOCYTES NFR BLD AUTO: 0 % (ref 0–0.5)
KETONES UR QL STRIP.AUTO: NEGATIVE MG/DL
LEUKOCYTE ESTERASE UR QL STRIP.AUTO: ABNORMAL
LYMPHOCYTES # BLD: 1.9 K/UL (ref 0.8–3.5)
LYMPHOCYTES NFR BLD: 26 % (ref 12–49)
MCH RBC QN AUTO: 31.3 PG (ref 26–34)
MCHC RBC AUTO-ENTMCNC: 32.4 G/DL (ref 30–36.5)
MCV RBC AUTO: 96.6 FL (ref 80–99)
MONOCYTES # BLD: 0.8 K/UL (ref 0–1)
MONOCYTES NFR BLD: 11 % (ref 5–13)
NEUTS SEG # BLD: 4.2 K/UL (ref 1.8–8)
NEUTS SEG NFR BLD: 59 % (ref 32–75)
NITRITE UR QL STRIP.AUTO: NEGATIVE
NRBC # BLD: 0 K/UL (ref 0–0.01)
NRBC BLD-RTO: 0 PER 100 WBC
OTHER,OTHU: ABNORMAL
PH UR STRIP: 7 [PH] (ref 5–8)
PHOSPHATE SERPL-MCNC: 2.5 MG/DL (ref 2.6–4.7)
PLATELET # BLD AUTO: 200 K/UL (ref 150–400)
PMV BLD AUTO: 10.2 FL (ref 8.9–12.9)
POTASSIUM SERPL-SCNC: 3.8 MMOL/L (ref 3.5–5.1)
PROT SERPL-MCNC: 6.2 G/DL (ref 6.4–8.2)
PROT UR STRIP-MCNC: NEGATIVE MG/DL
RBC # BLD AUTO: 3.83 M/UL (ref 3.8–5.2)
RBC #/AREA URNS HPF: ABNORMAL /HPF (ref 0–5)
SODIUM SERPL-SCNC: 140 MMOL/L (ref 136–145)
SP GR UR REFRACTOMETRY: 1.01 (ref 1–1.03)
UR CULT HOLD, URHOLD: NORMAL
UROBILINOGEN UR QL STRIP.AUTO: 0.2 EU/DL (ref 0.2–1)
WBC # BLD AUTO: 7.2 K/UL (ref 3.6–11)
WBC URNS QL MICRO: ABNORMAL /HPF (ref 0–4)

## 2020-11-18 PROCEDURE — 77030019905 HC CATH URETH INTMIT MDII -A

## 2020-11-18 PROCEDURE — 80069 RENAL FUNCTION PANEL: CPT

## 2020-11-18 PROCEDURE — 74011000258 HC RX REV CODE- 258: Performed by: HOSPITALIST

## 2020-11-18 PROCEDURE — 74011250637 HC RX REV CODE- 250/637: Performed by: HOSPITALIST

## 2020-11-18 PROCEDURE — 74011250636 HC RX REV CODE- 250/636: Performed by: HOSPITALIST

## 2020-11-18 PROCEDURE — 97162 PT EVAL MOD COMPLEX 30 MIN: CPT

## 2020-11-18 PROCEDURE — 97116 GAIT TRAINING THERAPY: CPT

## 2020-11-18 PROCEDURE — 74011250637 HC RX REV CODE- 250/637: Performed by: INTERNAL MEDICINE

## 2020-11-18 PROCEDURE — 87086 URINE CULTURE/COLONY COUNT: CPT

## 2020-11-18 PROCEDURE — 80076 HEPATIC FUNCTION PANEL: CPT

## 2020-11-18 PROCEDURE — 81001 URINALYSIS AUTO W/SCOPE: CPT

## 2020-11-18 PROCEDURE — 85025 COMPLETE CBC W/AUTO DIFF WBC: CPT

## 2020-11-18 PROCEDURE — 77030038269 HC DRN EXT URIN PURWCK BARD -A

## 2020-11-18 PROCEDURE — 97535 SELF CARE MNGMENT TRAINING: CPT

## 2020-11-18 PROCEDURE — 65660000000 HC RM CCU STEPDOWN

## 2020-11-18 PROCEDURE — 70551 MRI BRAIN STEM W/O DYE: CPT

## 2020-11-18 PROCEDURE — 36415 COLL VENOUS BLD VENIPUNCTURE: CPT

## 2020-11-18 PROCEDURE — 97165 OT EVAL LOW COMPLEX 30 MIN: CPT

## 2020-11-18 RX ORDER — ESCITALOPRAM OXALATE 10 MG/1
10 TABLET ORAL DAILY
COMMUNITY
End: 2020-11-19

## 2020-11-18 RX ADMIN — DULOXETINE 20 MG: 20 CAPSULE, DELAYED RELEASE ORAL at 21:20

## 2020-11-18 RX ADMIN — LISINOPRIL 20 MG: 20 TABLET ORAL at 06:52

## 2020-11-18 RX ADMIN — LEVOTHYROXINE SODIUM 25 MCG: 0.03 TABLET ORAL at 06:52

## 2020-11-18 RX ADMIN — ATORVASTATIN CALCIUM 40 MG: 40 TABLET, FILM COATED ORAL at 21:20

## 2020-11-18 RX ADMIN — Medication 10 ML: at 13:49

## 2020-11-18 RX ADMIN — CEFTRIAXONE SODIUM 1 G: 1 INJECTION, POWDER, FOR SOLUTION INTRAMUSCULAR; INTRAVENOUS at 18:30

## 2020-11-18 RX ADMIN — METOPROLOL TARTRATE 50 MG: 50 TABLET, FILM COATED ORAL at 09:20

## 2020-11-18 RX ADMIN — ACETAMINOPHEN 650 MG: 325 TABLET ORAL at 13:49

## 2020-11-18 RX ADMIN — PANTOPRAZOLE SODIUM 40 MG: 40 TABLET, DELAYED RELEASE ORAL at 06:52

## 2020-11-18 RX ADMIN — METOPROLOL TARTRATE 50 MG: 50 TABLET, FILM COATED ORAL at 21:20

## 2020-11-18 RX ADMIN — Medication 10 ML: at 21:21

## 2020-11-18 RX ADMIN — Medication 10 ML: at 06:52

## 2020-11-18 NOTE — PROGRESS NOTES
Bedside shift change report given to Lana (oncoming nurse) by Devika Parsons (offgoing nurse). Report included the following information SBAR, Kardex, MAR, Recent Results, Med Rec Status and Cardiac Rhythm Afib. Problem: Afib Pathway  Goal: Respiratory  Outcome: Progressing Towards Goal  Note- O2 sats within defined limits on room air    Problem: Hypertension  Goal: Blood pressure within specified parameters  Outcome: Progressing Towards Goal  Note- blood pressure within defined limits.  PRN hydralazine for systolic >793

## 2020-11-18 NOTE — PROGRESS NOTES
[address] [facility] Adult  Hospitalist Group                                                                                          Hospitalist Progress Note  Anjali Massey MD  Answering service: [phone] from in house phone        Date of Service:  2020  NAME:  Nnamdi Tse  :  3/3/1933  MRN:  [id number]      Admission Summary:   80-year-old woman with a past medical history significant for suspected dementia, chronic atrial fibrillation on not on anticoagulation, dyslipidemia, hypertension, hypothyroidism, anxiety/depression, was in her usual state of health until a couple of days ago when the patient developed confusion in the form of hallucination at nighttime. Interval history / Subjective:    Patient seen and examined     She was sitting in the chair today,worked with PT today. Per daughter she has hallucinations intermittently. Assessment & Plan:     # HTN urgency:  -started oral antihypertensives-lisinopril,metoprolol    #Suspected dementia with behavioral disturbances:  Fall PTA  -hallucinations likely related to dementia  UA negative at admission. Per daughter,her mother is not at baseline and this happens with UTI  -check UA again. -MRI brain-Atrophy and extensive chronic white matter disease, with no acute infarction. Multiple chronic microhemorrhages  -CT spine negative for fractire  -Psychiatry consulted    #Chronic atrial fib:  -rate controlled on metoprolol, not a candidate for anticoagulation per patient's cardiologist  (off pradaxa)    #Question left adnexal lesion on CT :  -abdominal USG when possible.       Code status: DNR  DVT prophylaxis:scd    Care Plan discussed with: patient,nurse,daughter  Anticipated Disposition: tbd  Anticipated Discharge: tbd     Hospital Problems  Date Reviewed: 2020          Codes Class Noted POA    * (Principal) Acute delirium ICD-10-CM: R41.0  ICD-9-CM: 780.09  2020 Yes                Review of Systems:   As per HPI    Vital Signs:    Last 24hrs VS reviewed since prior progress note. Most recent are:  Visit Vitals  BP (!) 158/71 (BP 1 Location: Left arm, BP Patient Position: At rest)   Pulse 70   Temp 97.9 °F (36.6 °C)   Resp 18   Wt 51.3 kg (113 lb 1.6 oz)   SpO2 100%   BMI 20.69 kg/m²         Intake/Output Summary (Last 24 hours) at 11/18/2020 1812  Last data filed at 11/18/2020 1600  Gross per 24 hour   Intake 240 ml   Output 600 ml   Net -360 ml        Physical Examination:     I had a face to face encounter with this patient and independently examined them on 11/18/2020 as outlined below:          Constitutional:  No acute distress,,elderly patient   ENT:  Oral mucosa moist, oropharynx benign. Resp:  CTA bilaterally. No wheezing/rhonchi/rales. No accessory muscle use   CV:  irregular rhythm, normal rate,     GI:  Soft, non distended, non tender. normoactive bowel sounds    Musculoskeletal:  Minimal LE edema    Neurologic:  AAOx  2, muscle strength UE and LE wnl     Psych:  Intermittent agitation        Data Review:    Review and/or order of clinical lab test  Review and/or order of tests in the radiology section of CPT  Review and/or order of tests in the medicine section of CPT      Labs:     Recent Labs     11/18/20  0330 11/17/20  0523   WBC 7.2 7.0   HGB 12.0 12.0   HCT 37.0 35.7  37.0    195     Recent Labs     11/18/20  0330 11/17/20  0838 11/17/20  0331    140 138   K 3.8 3.8 3.8    108 110*   CO2 26 24 21   BUN 13 13 15   CREA 0.69 0.52* <0.15*   * 89 88   CA 9.8 9.7 9.5   MG  --  2.0 <0.3*   PHOS 2.5* 2.7 <0.5*     Recent Labs     11/18/20  0330 11/17/20  0838 11/17/20  0331   ALT 12 9* 11*   AP 60 61 56   TBILI 0.7 0.8 0.7   TP 6.2* 6.1* 6.2*   ALB 3.7  3.7 3.7 3.6   GLOB 2.5 2.4 2.6     No results for input(s): INR, PTP, APTT, INREXT, INREXT in the last 72 hours. No results for input(s): FE, TIBC, PSAT, FERR in the last 72 hours.    Lab Results   Component Value Date/Time Folate 18.9 11/17/2020 05:23 AM      No results for input(s): PH, PCO2, PO2 in the last 72 hours.   Recent Labs     11/17/20  0838 11/17/20  0331 11/16/20  2228   TROIQ 0.11* 0.11* 0.11*     Lab Results   Component Value Date/Time    Cholesterol, total 159 11/17/2020 08:38 AM    HDL Cholesterol 65 11/17/2020 08:38 AM    LDL, calculated 73.2 11/17/2020 08:38 AM    Triglyceride 104 11/17/2020 08:38 AM    CHOL/HDL Ratio 2.4 11/17/2020 08:38 AM     Lab Results   Component Value Date/Time    Glucose (POC) 116 (H) 11/19/2014 06:49 AM     Lab Results   Component Value Date/Time    Color YELLOW/STRAW 11/18/2020 03:57 PM    Appearance CLEAR 11/18/2020 03:57 PM    Specific gravity 1.015 11/18/2020 03:57 PM    Specific gravity 1.020 11/16/2020 11:30 AM    pH (UA) 7.0 11/18/2020 03:57 PM    Protein Negative 11/18/2020 03:57 PM    Glucose Negative 11/18/2020 03:57 PM    Ketone Negative 11/18/2020 03:57 PM    Bilirubin Negative 11/18/2020 03:57 PM    Urobilinogen 0.2 11/18/2020 03:57 PM    Nitrites Negative 11/18/2020 03:57 PM    Leukocyte Esterase TRACE (A) 11/18/2020 03:57 PM    Epithelial cells FEW 11/18/2020 03:57 PM    Bacteria Negative 11/18/2020 03:57 PM    WBC 5-10 11/18/2020 03:57 PM    RBC 5-10 11/18/2020 03:57 PM         Medications Reviewed:     Current Facility-Administered Medications   Medication Dose Route Frequency    influenza vaccine 2020-21 (6 mos+)(PF) (FLUARIX/FLULAVAL/FLUZONE QUAD) injection 0.5 mL  0.5 mL IntraMUSCular PRIOR TO DISCHARGE    cefTRIAXone (ROCEPHIN) 1 g in 0.9% sodium chloride (MBP/ADV) 50 mL MBP  1 g IntraVENous Q24H    hydrALAZINE (APRESOLINE) 20 mg/mL injection 5 mg  5 mg IntraVENous Q6H PRN    DULoxetine (CYMBALTA) capsule 20 mg  20 mg Oral QHS    pantoprazole (PROTONIX) tablet 40 mg  40 mg Oral ACB    levothyroxine (SYNTHROID) tablet 25 mcg  25 mcg Oral ACB    lisinopriL (PRINIVIL, ZESTRIL) tablet 20 mg  20 mg Oral 7am    meclizine (ANTIVERT) tablet 25 mg  25 mg Oral DAILY PRN  metoprolol tartrate (LOPRESSOR) tablet 50 mg  50 mg Oral BID    atorvastatin (LIPITOR) tablet 40 mg  40 mg Oral QHS    sodium chloride (NS) flush 5-40 mL  5-40 mL IntraVENous Q8H    sodium chloride (NS) flush 5-40 mL  5-40 mL IntraVENous PRN    acetaminophen (TYLENOL) tablet 650 mg  650 mg Oral Q6H PRN    Or    acetaminophen (TYLENOL) suppository 650 mg  650 mg Rectal Q6H PRN    polyethylene glycol (MIRALAX) packet 17 g  17 g Oral DAILY PRN    promethazine (PHENERGAN) tablet 12.5 mg  12.5 mg Oral Q6H PRN    Or    ondansetron (ZOFRAN) injection 4 mg  4 mg IntraVENous Q6H PRN     ______________________________________________________________________  EXPECTED LENGTH OF STAY: 4d 12h  ACTUAL LENGTH OF STAY:          2                 Mary Pollock MD

## 2020-11-18 NOTE — H&P
[address] [facility] Adult  Hospitalist Group                                                                                          Hospitalist Progress Note  Jorge Maynard MD  Answering service: [phone] from in house phone        Date of Service:  2020  NAME:  Pasquale Beaver  :  3/3/1933  MRN:  [id number]      Admission Summary:   68-year-old woman with a past medical history significant for suspected dementia, chronic atrial fibrillation on not on anticoagulation, dyslipidemia, hypertension, hypothyroidism, anxiety/depression, was in her usual state of health until a couple of days ago when the patient developed confusion in the form of hallucination at nighttime. Interval history / Subjective:    Patient seen and examined     She is alert and oriented X 2-3. She spoke about multiple issues- dizziness, BP meds and other unrelated issues as well. Discussed with patient's daughter -states that patient lost her  and other family members recently,was gradually deteriorating-refusing to take meds, was eating OK      Assessment & Plan:     # HTN urgency:  -started oral antihypertensives-lisinopril,metoprolol    #Suspected dementia:  Fall PTA  -hallucinations likely related to dementia  UA negative  -MRI brain pending  -CT spine negative for fractire        #Chronic atrial fib:  -rate controlled on metoprolol, not a candidate for anticoagulation per patient's cardiologist  (off pradaxa)    Code status: DNR  DVT prophylaxis:scd    Care Plan discussed with: patient,nurse,daughter  Anticipated Disposition: tbd  Anticipated Discharge: tbd     Hospital Problems  Date Reviewed: 2020          Codes Class Noted POA    * (Principal) Acute delirium ICD-10-CM: R41.0  ICD-9-CM: 780.09  2020 Yes                Review of Systems:   As per HPI    Vital Signs:    Last 24hrs VS reviewed since prior progress note.  Most recent are:  Visit Vitals  BP (!) 159/75 (BP 1 Location: Right arm, BP Patient Position: At rest)   Pulse 76   Temp 98.7 °F (37.1 °C)   Resp 22   Wt 52.6 kg (116 lb)   SpO2 96%   BMI 21.22 kg/m²       No intake or output data in the 24 hours ending 11/18/20 0002     Physical Examination:     I had a face to face encounter with this patient and independently examined them on 11/18/2020 as outlined below:          Constitutional:  No acute distress, cooperative, pleasant ,elderly patient   ENT:  Oral mucosa moist, oropharynx benign. Resp:  CTA bilaterally. No wheezing/rhonchi/rales. No accessory muscle use   CV:  irregular rhythm, normal rate,     GI:  Soft, non distended, non tender. normoactive bowel sounds    Musculoskeletal:  Minimal LE edema    Neurologic:  AAOx  2-3, muscle strength UE and LE wnl     Psych:  Not anxious nor agitated this morning. Data Review:    Review and/or order of clinical lab test  Review and/or order of tests in the radiology section of CPT  Review and/or order of tests in the medicine section of CPT      Labs:     Recent Labs     11/17/20  0523 11/17/20 0331   WBC 7.0 SPECIMEN CLOTTED SUGGEST RECOLLECTION   HGB 12.0 SPECIMEN CLOTTED SUGGEST RECOLLECTION   HCT 37.0 SPECIMEN CLOTTED SUGGEST RECOLLECTION    SPECIMEN CLOTTED SUGGEST RECOLLECTION     Recent Labs     11/17/20  0838 11/17/20  0331 11/16/20  1245    138 140   K 3.8 3.8 4.0    110* 106   CO2 24 21 27   BUN 13 15 23*   CREA 0.52* <0.15* 0.87   GLU 89 88 99   CA 9.7 9.5 9.9   MG 2.0 <0.3*  --    PHOS 2.7 <0.5*  --      Recent Labs     11/17/20  0838 11/17/20  0331 11/16/20  1245   ALT 9* 11* 13   AP 61 56 60   TBILI 0.8 0.7 0.5   TP 6.1* 6.2* 6.8   ALB 3.7 3.6 3.7   GLOB 2.4 2.6 3.1     No results for input(s): INR, PTP, APTT, INREXT in the last 72 hours. No results for input(s): FE, TIBC, PSAT, FERR in the last 72 hours.    Lab Results   Component Value Date/Time    Folate 18.9 11/17/2020 05:23 AM      No results for input(s): PH, PCO2, PO2 in the last 72 hours.   Recent Labs     11/17/20  0838 11/17/20  0331 11/16/20  2228   TROIQ 0.11* 0.11* 0.11*     Lab Results   Component Value Date/Time    Cholesterol, total 159 11/17/2020 08:38 AM    HDL Cholesterol 65 11/17/2020 08:38 AM    LDL, calculated 73.2 11/17/2020 08:38 AM    Triglyceride 104 11/17/2020 08:38 AM    CHOL/HDL Ratio 2.4 11/17/2020 08:38 AM     Lab Results   Component Value Date/Time    Glucose (POC) 116 (H) 11/19/2014 06:49 AM     Lab Results   Component Value Date/Time    Color Yellow 11/16/2020 11:30 AM    Appearance Clear 11/16/2020 11:30 AM    Specific gravity 1.020 11/16/2020 11:30 AM    Specific gravity 1.008 02/21/2012 01:25 PM    pH (UA) 5.0 11/16/2020 11:30 AM    Protein Negative 11/16/2020 11:30 AM    Glucose Negative 11/16/2020 11:30 AM    Ketone Negative 11/16/2020 11:30 AM    Bilirubin Negative 11/16/2020 11:30 AM    Urobilinogen 0.1 (L) 11/16/2020 11:30 AM    Nitrites Negative 11/16/2020 11:30 AM    Leukocyte Esterase Negative 11/16/2020 11:30 AM    Epithelial cells Moderate (A) 11/16/2020 11:30 AM    Bacteria Negative 11/16/2020 11:30 AM    WBC 0-4 11/16/2020 11:30 AM    RBC 0-5 11/16/2020 11:30 AM         Medications Reviewed:     Current Facility-Administered Medications   Medication Dose Route Frequency    hydrALAZINE (APRESOLINE) 20 mg/mL injection 5 mg  5 mg IntraVENous Q6H PRN    DULoxetine (CYMBALTA) capsule 20 mg  20 mg Oral QHS    pantoprazole (PROTONIX) tablet 40 mg  40 mg Oral ACB    levothyroxine (SYNTHROID) tablet 25 mcg  25 mcg Oral ACB    lisinopriL (PRINIVIL, ZESTRIL) tablet 20 mg  20 mg Oral 7am    meclizine (ANTIVERT) tablet 25 mg  25 mg Oral DAILY PRN    metoprolol tartrate (LOPRESSOR) tablet 50 mg  50 mg Oral BID    atorvastatin (LIPITOR) tablet 40 mg  40 mg Oral QHS    sodium chloride (NS) flush 5-40 mL  5-40 mL IntraVENous Q8H    sodium chloride (NS) flush 5-40 mL  5-40 mL IntraVENous PRN    acetaminophen (TYLENOL) tablet 650 mg  650 mg Oral Q6H PRN    Or  acetaminophen (TYLENOL) suppository 650 mg  650 mg Rectal Q6H PRN    polyethylene glycol (MIRALAX) packet 17 g  17 g Oral DAILY PRN    promethazine (PHENERGAN) tablet 12.5 mg  12.5 mg Oral Q6H PRN    Or    ondansetron (ZOFRAN) injection 4 mg  4 mg IntraVENous Q6H PRN     ______________________________________________________________________  EXPECTED LENGTH OF STAY: - - -  ACTUAL LENGTH OF STAY:          2                 Stephy Barrios MD

## 2020-11-18 NOTE — PROGRESS NOTES
Problem: Self Care Deficits Care Plan (Adult)  Goal: *Acute Goals and Plan of Care (Insert Text)  Description:   FUNCTIONAL STATUS PRIOR TO ADMISSION: Patient was modified independent using a rollator and wheelchair for functional mobility, h/o of multiple falls. HOME SUPPORT: The patient lived with daughter who provides assistance with all self-care/IADLs. Occupational Therapy Goals  Initiated 11/18/2020  1. Patient will perform lower body dressing with supervision/set-up within 7 day(s). 2.  Patient will perform bathing with supervision/set-up within 7 day(s). 3.  Patient will perform lower body dressing with supervision/set-up within 7 day(s). 4.  Patient will perform toilet transfers with supervision/set-up within 7 day(s). 5.  Patient will perform all aspects of toileting with supervision/set-up within 7 day(s). 6.  Patient will participate in upper extremity therapeutic exercise/activities with supervision/set-up for 10 minutes within 7 day(s). Outcome: Progressing Towards Goal   OCCUPATIONAL THERAPY EVALUATION  Patient: Flako Dyson (65 y.o. female)  Date: 11/18/2020  Primary Diagnosis: Acute delirium [R41.0]        Precautions:   Fall    ASSESSMENT  Based on the objective data described below, the patient presents with decrease balance (posterior lean), confusion, generalized weakness, decrease mobility and decrease independence with self-care. Supportive daughter present in the room who says she has been living with her mom for 381 days. She plans on hiring assistance at home d/t increase falls and progressive memory loss. Recommend HH OT at discharge. Will con't to follow. Current Level of Function Impacting Discharge (ADLs/self-care): min to mod assist with mobility and self-care    Functional Outcome Measure: The patient scored 45/100 on the Barthel outcome measure which is indicative of impairment.       Other factors to consider for discharge:      Patient will benefit from skilled therapy intervention to address the above noted impairments. PLAN :  Recommendations and Planned Interventions: self care training, functional mobility training, therapeutic exercise, balance training, therapeutic activities, patient education, home safety training, and family training/education    Frequency/Duration: Patient will be followed by occupational therapy 3 times a week to address goals. Recommendation for discharge: (in order for the patient to meet his/her long term goals)  Occupational therapy at least 2 days/week in the home     This discharge recommendation:  A follow-up discussion with the attending provider and/or case management is planned    IF patient discharges home will need the following DME: none       SUBJECTIVE:   Patient stated It's November.     OBJECTIVE DATA SUMMARY:   HISTORY:   Past Medical History:   Diagnosis Date    Arrhythmia     atrial fib    Arthritis     Chronic pain     In back and right foot    GERD (gastroesophageal reflux disease)     Hypercholesterolemia     Hypertension     Other ill-defined conditions(799.89)     right ventricular \"thickening\" of heart    Other ill-defined conditions(799.89)     blood transfusion hx--2012    Psychiatric disorder     depression    Thyroid disease     Unspecified adverse effect of anesthesia     Itching     Past Surgical History:   Procedure Laterality Date    HX CATARACT REMOVAL      bilateral    HX OPEN CHOLECYSTECTOMY      HX ORTHOPAEDIC      Right Foot Surgery x 6    HX ORTHOPAEDIC      plate in foot    HX ORTHOPAEDIC      HX ORTHOPAEDIC  3/12/12     RIGHT TOTAL HIP REPLACEMENT    HX OTHER SURGICAL      hemorrhoidectomy    HX TONSILLECTOMY         Expanded or extensive additional review of patient history:     Home Situation  Home Environment: Private residence  Wheelchair Ramp: Yes  One/Two Story Residence: One story  Living Alone: No  Current DME Used/Available at Home: Lashae Preston, rollator, Wheelchair, Grab bars, Shower chair  Tub or Shower Type: Tub/Shower combination    Hand dominance: Right    EXAMINATION OF PERFORMANCE DEFICITS:  Cognitive/Behavioral Status:  Neurologic State: Alert  Orientation Level: Oriented to person;Oriented to place;Oriented to time;Disoriented to situation  Cognition: Decreased attention/concentration; Follows commands;Poor safety awareness             Skin: intact    Edema: none noted    Hearing:       Vision/Perceptual:                                     Range of Motion:    AROM: Generally decreased, functional  PROM: Generally decreased, functional                      Strength:    Strength: Generally decreased, functional(L LE residual weak s/p CVA, 3+/5)                Coordination:  Coordination: Within functional limits  Fine Motor Skills-Upper: Left Intact; Right Intact    Gross Motor Skills-Upper: Left Intact; Right Intact    Tone & Sensation:    Tone: Normal                         Balance:  Sitting: Impaired  Sitting - Static: Fair (occasional)  Sitting - Dynamic: Fair (occasional)(post lean)  Standing: Impaired  Standing - Static: Fair;Constant support;Poor  Standing - Dynamic : Fair;Constant support;Poor    Functional Mobility and Transfers for ADLs:  Bed Mobility:  Rolling: Minimum assistance  Supine to Sit: Minimum assistance  Scooting: Minimum assistance; Additional time    Transfers:  Sit to Stand: Minimum assistance(to provide anterior force to counter posterior lean)  Stand to Sit: Minimum assistance  Bed to Chair: Minimum assistance  Bathroom Mobility: Minimum assistance  Toilet Transfer : Minimum assistance    ADL Assessment:  Feeding: Setup    Oral Facial Hygiene/Grooming: Stand-by assistance    Bathing: Minimum assistance    Upper Body Dressing: Setup    Lower Body Dressing: Minimum assistance    Toileting:  Moderate assistance                ADL Intervention and task modifications:            Therapeutic Exercise:     Functional Measure:  Barthel Index:    Bathing: 0  Bladder: 5  Bowels: 5  Groomin  Dressin  Feedin  Mobility: 10  Stairs: 0  Toilet Use: 5  Transfer (Bed to Chair and Back): 10  Total: 45/100        The Barthel ADL Index: Guidelines  1. The index should be used as a record of what a patient does, not as a record of what a patient could do. 2. The main aim is to establish degree of independence from any help, physical or verbal, however minor and for whatever reason. 3. The need for supervision renders the patient not independent. 4. A patient's performance should be established using the best available evidence. Asking the patient, friends/relatives and nurses are the usual sources, but direct observation and common sense are also important. However direct testing is not needed. 5. Usually the patient's performance over the preceding 24-48 hours is important, but occasionally longer periods will be relevant. 6. Middle categories imply that the patient supplies over 50 per cent of the effort. 7. Use of aids to be independent is allowed. Dorene Hughes., Barthel, D.W. (1183). Functional evaluation: the Barthel Index. [address] [facility] (14)2. VIOLET WareF, Keely Bui., Nicola Mason., [place], [address] [place] [address] (). Measuring the change indisability after inpatient rehabilitation; comparison of the responsiveness of the Barthel Index and Functional [place] Measure. Journal of Neurology, Neurosurgery, and Psychiatry, 66(4), 170-364. Jose R Tafoya, ZAKIA.J.A, CURTIS RoblesJ.SANDRA, & Deane Saint, M.A. (2004.) Assessment of post-stroke quality of life in cost-effectiveness studies: The usefulness of the Barthel Index and the EuroQoL-5D.  Quality of Life Research, 15, 431-46        Occupational Therapy Evaluation Charge Determination   History Examination Decision-Making   LOW Complexity : Brief history review  MEDIUM Complexity : 3-5 performance deficits relating to physical, cognitive , or psychosocial skils that result in activity limitations and / or participation restrictions MEDIUM Complexity : Patient may present with comorbidities that affect occupational performnce. Miniml to moderate modification of tasks or assistance (eg, physical or verbal ) with assesment(s) is necessary to enable patient to complete evaluation       Based on the above components, the patient evaluation is determined to be of the following complexity level: LOW   Pain Rating:  No c/o pain    Activity Tolerance:   Good    After treatment patient left in no apparent distress:    Sitting in chair, Call bell within reach, Bed / chair alarm activated, and Caregiver / family present    COMMUNICATION/EDUCATION:   The patients plan of care was discussed with: Physical therapist and Registered nurse. Home safety education was provided and the patient/caregiver indicated understanding., Patient/family have participated as able in goal setting and plan of care. , and Patient/family agree to work toward stated goals and plan of care. This patients plan of care is appropriate for delegation to [facility] [place].     Thank you for this referral.  Lisbet Schroeder, OTR/L  Time Calculation: 43 mins

## 2020-11-18 NOTE — PROGRESS NOTES
1200: Bedside shift change report given to Courtney Adams (oncoming nurse) by Jones Higgins (offgoing nurse). Report included the following information SBAR, Kardex, Procedure Summary, Intake/Output, MAR, Accordion, Recent Results, Med Rec Status and Cardiac Rhythm AFIB. Problem: Falls - Risk of  Goal: *Absence of Falls  Description: Document Rosita Brown Fall Risk and appropriate interventions in the flowsheet.   Outcome: Progressing Towards Goal  Note: Fall Risk Interventions:  Mobility Interventions: Assess mobility with egress test, Bed/chair exit alarm, OT consult for ADLs, Communicate number of staff needed for ambulation/transfer, Patient to call before getting OOB, PT Consult for mobility concerns, PT Consult for assist device competence, Strengthening exercises (ROM-active/passive), Utilize walker, cane, or other assistive device, Utilize gait belt for transfers/ambulation    Mentation Interventions: Adequate sleep, hydration, pain control, Bed/chair exit alarm, Door open when patient unattended, Evaluate medications/consider consulting pharmacy, Eyeglasses and hearing aids, Familiar objects from home, Family/sitter at bedside, HELP ([address]) if available, Gait belt with transfers/ambulation, Increase mobility, More frequent rounding, Reorient patient, Room close to nurse's station, Self-releasing belt, Toileting rounds, Update white board    Medication Interventions: Bed/chair exit alarm, Utilize gait belt for transfers/ambulation, Evaluate medications/consider consulting pharmacy, Patient to call before getting OOB, Teach patient to arise slowly    Elimination Interventions: Bed/chair exit alarm, Call light in reach, Elevated toilet seat, Patient to call for help with toileting needs, Toilet paper/wipes in reach, Toileting schedule/hourly rounds, Stay With Me (per policy)              Problem: Pressure Injury - Risk of  Goal: *Prevention of pressure injury  Description: Document Devang Scale and appropriate interventions in the flowsheet.   Outcome: Progressing Towards Goal  Note: Pressure Injury Interventions:  Sensory Interventions: Assess changes in LOC, Assess need for specialty bed, Avoid rigorous massage over bony prominences, Chair cushion, Check visual cues for pain, Discuss PT/OT consult with provider, Keep linens dry and wrinkle-free, Float heels, Maintain/enhance activity level, Minimize linen layers    Moisture Interventions: Absorbent underpads, Assess need for specialty bed, Check for incontinence Q2 hours and as needed, Apply protective barrier, creams and emollients, Limit adult briefs, Internal/External urinary devices, Maintain skin hydration (lotion/cream), Minimize layers, Moisture barrier, Offer toileting Q_hr    Activity Interventions: Assess need for specialty bed, Chair cushion, Increase time out of bed, PT/OT evaluation    Mobility Interventions: Assess need for specialty bed, Chair cushion, Float heels, HOB 30 degrees or less, PT/OT evaluation    Nutrition Interventions: Document food/fluid/supplement intake, Discuss nutritional consult with provider, Offer support with meals,snacks and hydration    Friction and Shear Interventions: Apply protective barrier, creams and emollients, Foam dressings/transparent film/skin sealants, HOB 30 degrees or less, Feet elevated on foot rest, Minimize layers, Sit at 90-degree angle, Lift sheet, Lift team/patient mobility team                Problem: Afib Pathway: Day 3  Goal: Medications  Outcome: Progressing Towards Goal  Note: Patient in afib, rate controlled, PO metoprolol

## 2020-11-18 NOTE — PROGRESS NOTES
Problem: Falls - Risk of  Goal: *Absence of Falls  Description: Document Tra Telles Fall Risk and appropriate interventions in the flowsheet. Outcome: Progressing Towards Goal  Note: Fall Risk Interventions:  Mobility Interventions: Bed/chair exit alarm, Communicate number of staff needed for ambulation/transfer, Patient to call before getting OOB, PT Consult for mobility concerns, PT Consult for assist device competence    Mentation Interventions: Adequate sleep, hydration, pain control, Bed/chair exit alarm, Door open when patient unattended, Evaluate medications/consider consulting pharmacy, Increase mobility, More frequent rounding, Reorient patient, Room close to nurse's station    Medication Interventions: Bed/chair exit alarm, Evaluate medications/consider consulting pharmacy, Teach patient to arise slowly    Elimination Interventions: Bed/chair exit alarm, Call light in reach, Toilet paper/wipes in reach, Toileting schedule/hourly rounds    Pt remains on bed alarm. Problem: Afib Pathway: Day 2  Goal: Medications  Outcome: Progressing Towards Goal  Goal: Respiratory  Outcome: Progressing Towards Goal   Pt anticoagulation discontinued this shift due to patient having frequent falls at home. Pt remains rate controlled. Problem: Hypertension  Goal: *Blood pressure within specified parameters  Outcome: Progressing Towards Goal   Pt SBP elevated >200 at beginning of shift, BP more managed throughout the shift as BP medications resumed. MRI screening form completed with patients daughter, Stephanie Vega. 1600: Bedside shift change report given to Oralia Garza (oncoming nurse) by Azra López RN (offgoing nurse). Report included the following information SBAR, ED Summary, Intake/Output, MAR, Recent Results, Med Rec Status, and Cardiac Rhythm A fib .

## 2020-11-18 NOTE — PROGRESS NOTES
Problem: Mobility Impaired (Adult and Pediatric)  Goal: *Acute Goals and Plan of Care (Insert Text)  Description: FUNCTIONAL STATUS PRIOR TO ADMISSION: Patient was supervision level using a rollator and rolling walker for functional mobility. HOME SUPPORT PRIOR TO ADMISSION: The patient lived with her daughter in a one level home. Daughter reports many falls, typically posterior direction. Physical Therapy Goals  Initiated 11/18/2020  1. Patient will move from supine to sit and sit to supine , scoot up and down, and roll side to side in bed with supervision/set-up within 7 day(s). 2.  Patient will transfer from bed to chair and chair to bed with supervision/set-up using the least restrictive device within 7 day(s). 3.  Patient will perform sit to stand with supervision/set-up within 7 day(s). 4.  Patient will ambulate with supervision/set-up for 50 feet with the least restrictive device within 7 day(s). 5.  Patient will ascend/descend 3 stairs with 1 handrail(s) with minimal assistance/contact guard assist within 7 day(s). Outcome: Progressing Towards Goal   PHYSICAL THERAPY EVALUATION  Patient: Pasquale Beaver (7 y.o. female)  Date: 11/18/2020  Primary Diagnosis: Acute delirium [R41.0]        Precautions:   Fall      ASSESSMENT  Based on the objective data described below, the patient presents with impaired cognition, decreased safety awareness, impaired dynamic sitting and standing balance, gait dysfunction and decreased activity tolerance s/p admission for acute delirium. Patient very pleasant and followed commands well, daughter present for session with whom she resides. Overall, patient requiring MIN A for all functional mobility for safety. Demonstrates posterior lean in sitting EOB that resolves with tactile corrections. More notable in standing, improving with use of RW and during gait.   Daughter reports many falls (several per month), always posterior and they were getting an orthotic of some sort for her L foot due to her hammer toes and possible foot drop from prior CVA. At this time, she is a very high fall risk due to her physical deficits as well as lack of insight and memory loss. Daughter reports preference to d/c home with HHPT and hired aides to assist her versus rehab, reports that she will continue to provide 24/7 assistance. Current Level of Function Impacting Discharge (mobility/balance): MIN A for transfers, bed mobility, MIN/MOD for gait with RW x 15'    Functional Outcome Measure: The patient scored Total Score: 7/28 on the Tinetti outcome measure which is indicative of high fall risk. Other factors to consider for discharge: daughter provides 24/7 care but little other support     Patient will benefit from skilled therapy intervention to address the above noted impairments. PLAN :  Recommendations and Planned Interventions: bed mobility training, transfer training, gait training, therapeutic exercises, neuromuscular re-education, patient and family training/education, and therapeutic activities      Frequency/Duration: Patient will be followed by physical therapy:  5 times a week to address goals. Recommendation for discharge: (in order for the patient to meet his/her long term goals)  Physical therapy at least 2 days/week in the home AND ensure assist and/or supervision for safety with 24/7 physical assistance     This discharge recommendation:  Has been made in collaboration with the attending provider and/or case management    IF patient discharges home will need the following DME: patient owns DME required for discharge         SUBJECTIVE:   Patient stated Ya'll are just the prettiest, sweetest things.     OBJECTIVE DATA SUMMARY:   HISTORY:    Past Medical History:   Diagnosis Date    Arrhythmia     atrial fib    Arthritis     Chronic pain     In back and right foot    GERD (gastroesophageal reflux disease)     Hypercholesterolemia Hypertension     Other ill-defined conditions(799.89)     right ventricular \"thickening\" of heart    Other ill-defined conditions(799.89)     blood transfusion hx--2012    Psychiatric disorder     depression    Thyroid disease     Unspecified adverse effect of anesthesia     Itching     Past Surgical History:   Procedure Laterality Date    HX CATARACT REMOVAL      bilateral    HX OPEN CHOLECYSTECTOMY      HX ORTHOPAEDIC      Right Foot Surgery x 6    HX ORTHOPAEDIC      plate in foot    HX ORTHOPAEDIC      HX ORTHOPAEDIC  3/12/12     RIGHT TOTAL HIP REPLACEMENT    HX OTHER SURGICAL      hemorrhoidectomy    HX TONSILLECTOMY         Personal factors and/or comorbidities impacting plan of care: memory deficits, hx of CVA/L weakness    Home Situation  Home Environment: Private residence  Wheelchair Ramp: Yes  One/Two Story Residence: One story  Living Alone: No  Current DME Used/Available at Home: [address], rollator, Wheelchair, Grab bars, Shower chair  Tub or Shower Type: Tub/Shower combination    EXAMINATION/PRESENTATION/DECISION MAKING:   Critical Behavior:  Neurologic State: Alert  Orientation Level: Oriented to person, Oriented to place, Oriented to time, Disoriented to situation  Cognition: Decreased attention/concentration, Follows commands, Poor safety awareness     Hearing:       Range Of Motion:  AROM: Generally decreased, functional           PROM: Generally decreased, functional           Strength:    Strength: Generally decreased, functional(L LE residual weak s/p CVA, 3+/5)                    Tone & Sensation:   Tone: Normal                              Coordination:  Coordination: Within functional limits  Vision:      Functional Mobility:  Bed Mobility:  Rolling: Minimum assistance  Supine to Sit: Minimum assistance     Scooting: Minimum assistance; Additional time  Transfers:  Sit to Stand: Minimum assistance(to provide anterior force to counter posterior lean)  Stand to Sit: Minimum assistance        Bed to Chair: Minimum assistance              Balance:   Sitting: Impaired  Sitting - Static: Fair (occasional)  Sitting - Dynamic: Fair (occasional)(post lean)  Standing: Impaired  Standing - Static: Fair;Constant support;Poor  Standing - Dynamic : Fair;Constant support;Poor  Ambulation/Gait Training:  Distance (ft): 15 Feet (ft)(x 2 trials)  Assistive Device: Gait belt;Walker, rolling  Ambulation - Level of Assistance: Minimal assistance; Moderate assistance;Assist x1;Adaptive equipment(RW)        Gait Abnormalities: Decreased step clearance; Step to gait;Trunk sway increased; Path deviations        Base of Support: Narrowed; Center of gravity altered     Speed/Ashley: Pace decreased (<100 feet/min)  Step Length: Right shortened;Left shortened                    Functional Measure:  Tinetti test:    Sitting Balance: 1  Arises: 1  Attempts to Rise: 1  Immediate Standing Balance: 0  Standing Balance: 0  Nudged: 0  Eyes Closed: 0  Turn 360 Degrees - Continuous/Discontinuous: 0  Turn 360 Degrees - Steady/Unsteady: 0  Sitting Down: 1  Balance Score: 4 Balance total score  Indication of Gait: 1  R Step Length/Height: 0  L Step Length/Height: 0  R Foot Clearance: 1  L Foot Clearance: 0  Step Symmetry: 0  Step Continuity: 0  Path: 1  Trunk: 0  Walking Time: 0  Gait Score: 3 Gait total score  Total Score: 7/28 Overall total score         Tinetti Tool Score Risk of Falls  <19 = High Fall Risk  19-24 = Moderate Fall Risk  25-28 = Low Fall Risk  Tinetti ME. Performance-Oriented Assessment of Mobility Problems in Elderly Patients. [place] 66; [id number].  (Scoring Description: PT Bulletin Feb. 10, 1993)    Older adults: Olinda Ye et al, 2009; n = 1000 [facility] elderly evaluated with ABC, CHIOMA, ADL, and IADL)  · Mean CHIOMA score for males aged 69-68 years = 26.21(3.40)  · Mean CHIOMA score for females age 69-68 years = 25.16(4.30)  · Mean CHIOMA score for males over 80 years = 23.29(6.02)  · Mean CHIOMA score for females over 80 years = 17.20(8.32) Physical Therapy Evaluation Charge Determination   History Examination Presentation Decision-Making   HIGH Complexity :3+ comorbidities / personal factors will impact the outcome/ POC  MEDIUM Complexity : 3 Standardized tests and measures addressing body structure, function, activity limitation and / or participation in recreation  MEDIUM Complexity : Evolving with changing characteristics  Other outcome measures Tinetti 7/28  HIGH       Based on the above components, the patient evaluation is determined to be of the following complexity level: MEDIUM    Pain Rating:  None reported    Activity Tolerance:   Fair and SpO2 stable on RA      After treatment patient left in no apparent distress:   Sitting in chair, Call bell within reach, Bed / chair alarm activated, and Caregiver / family present    COMMUNICATION/EDUCATION:   The patients plan of care was discussed with: Occupational therapist and Registered nurse. Fall prevention education was provided and the patient/caregiver indicated understanding. and Patient/family agree to work toward stated goals and plan of care.     Thank you for this referral.  Sabi Pike, PT   Time Calculation: 43 mins

## 2020-11-18 NOTE — PROGRESS NOTES
1830:  Patient showing increased agitation and refuses to stay in bed at this time. Notified Dr Cheri Frazier. Orders received for sitter at this time. 2000: Bedside and Verbal shift change report given to Adriane Armenta RN (oncoming nurse) by Yury Arellano RN (offgoing nurse). Report included the following information SBAR, Kardex, Intake/Output, MAR, Accordion, Recent Results, Med Rec Status and Cardiac Rhythm A fib.     Hourly rounds completed throughout shift

## 2020-11-19 VITALS
RESPIRATION RATE: 17 BRPM | TEMPERATURE: 98.5 F | WEIGHT: 114 LBS | HEART RATE: 64 BPM | BODY MASS INDEX: 20.85 KG/M2 | SYSTOLIC BLOOD PRESSURE: 128 MMHG | DIASTOLIC BLOOD PRESSURE: 62 MMHG | OXYGEN SATURATION: 97 %

## 2020-11-19 PROBLEM — I48.20 CHRONIC ATRIAL FIBRILLATION (HCC): Status: ACTIVE | Noted: 2020-11-19

## 2020-11-19 PROCEDURE — 97530 THERAPEUTIC ACTIVITIES: CPT

## 2020-11-19 PROCEDURE — 90686 IIV4 VACC NO PRSV 0.5 ML IM: CPT | Performed by: HOSPITALIST

## 2020-11-19 PROCEDURE — 74011250637 HC RX REV CODE- 250/637: Performed by: INTERNAL MEDICINE

## 2020-11-19 PROCEDURE — 90471 IMMUNIZATION ADMIN: CPT

## 2020-11-19 PROCEDURE — 74011250636 HC RX REV CODE- 250/636: Performed by: HOSPITALIST

## 2020-11-19 PROCEDURE — 97116 GAIT TRAINING THERAPY: CPT

## 2020-11-19 RX ORDER — MIRTAZAPINE 15 MG/1
7.5 TABLET, FILM COATED ORAL
Status: DISCONTINUED | OUTPATIENT
Start: 2020-11-19 | End: 2020-11-19 | Stop reason: HOSPADM

## 2020-11-19 RX ORDER — CEPHALEXIN 500 MG/1
500 CAPSULE ORAL 4 TIMES DAILY
Qty: 8 CAP | Refills: 0 | Status: SHIPPED | OUTPATIENT
Start: 2020-11-19 | End: 2020-11-21

## 2020-11-19 RX ORDER — DULOXETIN HYDROCHLORIDE 20 MG/1
20 CAPSULE, DELAYED RELEASE ORAL
Qty: 30 CAP | Refills: 0 | Status: SHIPPED | OUTPATIENT
Start: 2020-11-19 | End: 2020-12-19

## 2020-11-19 RX ORDER — MIRTAZAPINE 7.5 MG/1
7.5 TABLET, FILM COATED ORAL
Qty: 30 TAB | Refills: 0 | Status: SHIPPED | OUTPATIENT
Start: 2020-11-19 | End: 2020-12-19

## 2020-11-19 RX ADMIN — METOPROLOL TARTRATE 50 MG: 50 TABLET, FILM COATED ORAL at 08:27

## 2020-11-19 RX ADMIN — PANTOPRAZOLE SODIUM 40 MG: 40 TABLET, DELAYED RELEASE ORAL at 07:01

## 2020-11-19 RX ADMIN — Medication 10 ML: at 06:00

## 2020-11-19 RX ADMIN — INFLUENZA VIRUS VACCINE 0.5 ML: 15; 15; 15; 15 SUSPENSION INTRAMUSCULAR at 14:57

## 2020-11-19 RX ADMIN — LISINOPRIL 20 MG: 20 TABLET ORAL at 07:01

## 2020-11-19 RX ADMIN — LEVOTHYROXINE SODIUM 25 MCG: 0.03 TABLET ORAL at 07:01

## 2020-11-19 NOTE — CONSULTS
Continue cymbalta 20mg. Start remeron 7.5mg to help with appetite, sleep, mood. Follow up with outpatient provider after dc. Full consult dictated.

## 2020-11-19 NOTE — ACP (ADVANCE CARE PLANNING)
[address] [facility]  Hospitalist Group                                      Advance Care Planning Note    Name: Margareth Smith  YOB: 1933  MRN: [id number]  Admission Date: 11/16/2020  8:59 PM    Date of discussion: 11/19/2020    Active Diagnoses:    Hospital Problems  Date Reviewed: 11/16/2020          Codes Class Noted POA    * (Principal) Acute delirium ICD-10-CM: R41.0  ICD-9-CM: 780.09  11/16/2020 Yes            #Dementia  #HTN uncontrolled    These active diagnoses are of sufficient risk that focused discussion on advance care planning is indicated in order to allow the patient to thoughtfully consider personal goals of care, and if situations arise that prevent the ability to personally give input, to ensure appropriate representation of their personal desires for different levels and aggressiveness of care. Discussion:     Persons present and participating in discussion: James Conner MD    Topics Discussed:  Patient's medical condition and diagnosis: [x  ] yes [  ] no   Surrogate decision maker: [ x ] yes [  ] no   Patient's current physical function/cognitive function/frailty: [ x ] yes [  ] no   Code Status: [ x ] yes [  ] no       Overview of Discussion:   Patient unable to make her own medical decisions. Spoke to Ambar Mercedes (patient's daughter )over phone. She told me that patient's  passed away recently as well two other family members. Patient has been depressed , was refusing medications. I discussed what resuscitation means in case of cardiac or respiratory arrest(intubation,chest compressions) etc.  told me that her mother expressed her wishes to be  DNR(she said her step father was a DNR and they know what it means). Time Spent: 10 minutes    Total time spent face-to-face in education and discussion: 10 minutes.      James Hong MD  Date of Service:  11/19/2020  11:29 AM

## 2020-11-19 NOTE — PROGRESS NOTES
Problem: Falls - Risk of  Goal: *Absence of Falls  Description: Document Florin Klein Fall Risk and appropriate interventions in the flowsheet. 11/19/2020 1446 by Brett Hurley  Outcome: Resolved/Met  11/19/2020 1140 by Brett Hurley  Outcome: Progressing Towards Goal  Note: Fall Risk Interventions:  Mobility Interventions: Communicate number of staff needed for ambulation/transfer, Patient to call before getting OOB, PT Consult for mobility concerns, PT Consult for assist device competence, Utilize walker, cane, or other assistive device    Mentation Interventions: Door open when patient unattended, Reorient patient    Medication Interventions: Teach patient to arise slowly, Patient to call before getting OOB    Elimination Interventions: Call light in reach, Patient to call for help with toileting needs, Toileting schedule/hourly rounds    History of Falls Interventions: Bed/chair exit alarm, Room close to nurse's station, Investigate reason for fall, Door open when patient unattended         Problem: Patient Education: Go to Patient Education Activity  Goal: Patient/Family Education  11/19/2020 1446 by Peri Fields April   Outcome: Resolved/Met  11/19/2020 1140 by Peri Fields April   Outcome: Progressing Towards Goal     Problem: Pressure Injury - Risk of  Goal: *Prevention of pressure injury  Description: Document Devang Scale and appropriate interventions in the flowsheet.   11/19/2020 1446 by Brett Hurley  Outcome: Resolved/Met  11/19/2020 1140 by Brett Hurley  Outcome: Progressing Towards Goal  Note: Pressure Injury Interventions:  Sensory Interventions: Assess changes in LOC, Pressure redistribution bed/mattress (bed type)    Moisture Interventions: Absorbent underpads, Internal/External urinary devices, Limit adult briefs    Activity Interventions: Pressure redistribution bed/mattress(bed type), Increase time out of bed    Mobility Interventions: HOB 30 degrees or less, Pressure redistribution bed/mattress (bed type)    Nutrition Interventions: Document food/fluid/supplement intake    Friction and Shear Interventions: HOB 30 degrees or less, Lift sheet                Problem: Patient Education: Go to Patient Education Activity  Goal: Patient/Family Education  11/19/2020 1446 by Shanda Godoy April M  Outcome: Resolved/Met  11/19/2020 1140 by Aramis Cherryor  Outcome: Progressing Towards Goal     Problem: Patient Education: Go to Patient Education Activity  Goal: Patient/Family Education  11/19/2020 1446 by Shanda Godoy April M  Outcome: Resolved/Met  11/19/2020 1140 by Shanda Godoy April M  Outcome: Progressing Towards Goal     Problem: Afib Pathway: Day 1  Goal: Off Pathway (Use only if patient is Off Pathway)  11/19/2020 1446 by Shanda Godoy April M  Outcome: Resolved/Met  11/19/2020 1140 by Shanda Godoy April M  Outcome: Progressing Towards Goal  Goal: Activity/Safety  11/19/2020 1446 by Shanda Godoy April M  Outcome: Resolved/Met  11/19/2020 1140 by Shanda Godoy April M  Outcome: Progressing Towards Goal  Goal: Consults, if ordered  11/19/2020 1446 by Shanda Godoy April M  Outcome: Resolved/Met  11/19/2020 1140 by Shanda Godoy April M  Outcome: Progressing Towards Goal  Goal: Diagnostic Test/Procedures  11/19/2020 1446 by Shanda Godoy April M  Outcome: Resolved/Met  11/19/2020 1140 by Aramis France  Outcome: Progressing Towards Goal  Goal: Nutrition/Diet  11/19/2020 1446 by Shanda Godoy April M  Outcome: Resolved/Met  11/19/2020 1140 by Aramis France  Outcome: Progressing Towards Goal  Goal: Discharge Planning  11/19/2020 1446 by Shanda Godoy April M  Outcome: Resolved/Met  11/19/2020 1140 by Aramis Cherryor  Outcome: Progressing Towards Goal  Goal: Medications  11/19/2020 1446 by Shanda Godoy April M  Outcome: Resolved/Met  11/19/2020 1140 by Aramis Cherryor  Outcome: Progressing Towards Goal  Goal: Respiratory  11/19/2020 1446 by Shanda Godoy April M  Outcome: Resolved/Met  11/19/2020 1140 by Aramis France  Outcome: Progressing Towards Goal  Goal: Treatments/Interventions/Procedures  11/19/2020 1446 by India Koehler, April M  Outcome: Resolved/Met  11/19/2020 1140 by Frank Knife  Outcome: Progressing Towards Goal  Goal: Psychosocial  11/19/2020 1446 by India Koehler, April M  Outcome: Resolved/Met  11/19/2020 1140 by India Koehler, April M  Outcome: Progressing Towards Goal  Goal: *Optimal pain control at patient's stated goal  11/19/2020 1446 by Inida Koehler, April M  Outcome: Resolved/Met  11/19/2020 1140 by nIdia Koehler April M  Outcome: Progressing Towards Goal  Goal: *Hemodynamically stable  11/19/2020 1446 by India Koehler, April M  Outcome: Resolved/Met  11/19/2020 1140 by India Koehler, April M  Outcome: Progressing Towards Goal  Goal: *Stable cardiac rhythm  11/19/2020 1446 by India Koehler, April M  Outcome: Resolved/Met  11/19/2020 1140 by India Koehler April M  Outcome: Progressing Towards Goal  Goal: *Lungs clear or at baseline  11/19/2020 1446 by India Koehler, April M  Outcome: Resolved/Met  11/19/2020 1140 by India Koehler April M  Outcome: Progressing Towards Goal  Goal: *Labs within defined limits  11/19/2020 1446 by India Koehler, April M  Outcome: Resolved/Met  11/19/2020 1140 by India Koehler April M  Outcome: Progressing Towards Goal  Goal: *Describes available resources and support systems  11/19/2020 1446 by India Koehler, April M  Outcome: Resolved/Met  11/19/2020 1140 by India Koehler April M  Outcome: Progressing Towards Goal     Problem: Afib Pathway: Day 2  Goal: Off Pathway (Use only if patient is Off Pathway)  11/19/2020 1446 by India Koehler, April M  Outcome: Resolved/Met  11/19/2020 1140 by Frank Vogel  Outcome: Progressing Towards Goal  Goal: Activity/Safety  11/19/2020 1446 by India Koehler, April M  Outcome: Resolved/Met  11/19/2020 1140 by India Koehler April M  Outcome: Progressing Towards Goal  Goal: Consults, if ordered  11/19/2020 1446 by Kimberlee Avelar  Outcome: Resolved/Met  11/19/2020 1140 by Excell Knife  Outcome: Progressing Towards Goal  Goal: Diagnostic Test/Procedures  11/19/2020 1446 by Mellisa Galvez April M  Outcome: Resolved/Met  11/19/2020 1140 by Bill Liz  Outcome: Progressing Towards Goal  Goal: Nutrition/Diet  11/19/2020 1446 by Mellisa Galvez April M  Outcome: Resolved/Met  11/19/2020 1140 by Bill Liz  Outcome: Progressing Towards Goal  Goal: Discharge Planning  11/19/2020 1446 by Mellisa Galvez April M  Outcome: Resolved/Met  11/19/2020 1140 by Mellisa Galvez April M  Outcome: Progressing Towards Goal  Goal: Medications  11/19/2020 1446 by Mellisa Galvez April M  Outcome: Resolved/Met  11/19/2020 1140 by Mellisa Galvez April M  Outcome: Progressing Towards Goal  Goal: Respiratory  11/19/2020 1446 by Mellisa Galvez April M  Outcome: Resolved/Met  11/19/2020 1140 by Mellisa Galvez April M  Outcome: Progressing Towards Goal  Goal: Treatments/Interventions/Procedures  11/19/2020 1446 by Mellisa Galvez April M  Outcome: Resolved/Met  11/19/2020 1140 by Mellisa Galvez April M  Outcome: Progressing Towards Goal  Goal: Psychosocial  11/19/2020 1446 by Mellisa Galvez April M  Outcome: Resolved/Met  11/19/2020 1140 by Mellisa Galvez April M  Outcome: Progressing Towards Goal  Goal: *Hemodynamically stable  11/19/2020 1446 by Mellisa Galvez April M  Outcome: Resolved/Met  11/19/2020 1140 by Mellisa Galvez April M  Outcome: Progressing Towards Goal  Goal: *Optimal pain control at patient's stated goal  11/19/2020 1446 by Mellisa Galvez April M  Outcome: Resolved/Met  11/19/2020 1140 by Mellisa Galvez April M  Outcome: Progressing Towards Goal  Goal: *Stable cardiac rhythm  11/19/2020 1446 by Mellisa Galvez April M  Outcome: Resolved/Met  11/19/2020 1140 by Mellisa Galvez April M  Outcome: Progressing Towards Goal  Goal: *Lungs clear or at baseline  11/19/2020 1446 by Mellisa Galvez April SANDRA  Outcome: Resolved/Met  11/19/2020 1140 by Mellisa Galvez April M  Outcome: Progressing Towards Goal  Goal: *Describes available resources and support systems  11/19/2020 1446 by Kimberlee Ramsay  Outcome: Resolved/Met  11/19/2020 1143 by Demetrius Alberto April M  Outcome: Progressing Towards Goal     Problem: Afib Pathway: Day 3  Goal: Off Pathway (Use only if patient is Off Pathway)  11/19/2020 1446 by Demetrius Alberto April M  Outcome: Resolved/Met  11/19/2020 1140 by Demetrius Alberto April M  Outcome: Progressing Towards Goal  Goal: Activity/Safety  11/19/2020 1446 by Demetrius Alberto April M  Outcome: Resolved/Met  11/19/2020 1140 by Demetrius Alberto April M  Outcome: Progressing Towards Goal  Goal: Diagnostic Test/Procedures  11/19/2020 1446 by Demetrius Alberto April M  Outcome: Resolved/Met  11/19/2020 1140 by Demetrius Alberto April M  Outcome: Progressing Towards Goal  Goal: Nutrition/Diet  11/19/2020 1446 by Demetrius Alberto April M  Outcome: Resolved/Met  11/19/2020 1140 by Demetrius Alberto April M  Outcome: Progressing Towards Goal  Goal: Discharge Planning  11/19/2020 1446 by Demetrius Alberto April M  Outcome: Resolved/Met  11/19/2020 1140 by Demetrius Alberto April M  Outcome: Progressing Towards Goal  Goal: Medications  11/19/2020 1446 by Demetrius Alberto April M  Outcome: Resolved/Met  11/19/2020 1140 by Demetrius Alberto April M  Outcome: Progressing Towards Goal  Goal: Respiratory  11/19/2020 1446 by Demetrius Alberto April M  Outcome: Resolved/Met  11/19/2020 1140 by Demetrius Alberto April M  Outcome: Progressing Towards Goal  Goal: Treatments/Interventions/Procedures  11/19/2020 1446 by Demetrius Alberto April M  Outcome: Resolved/Met  11/19/2020 1140 by Demetrius Alberto April M  Outcome: Progressing Towards Goal  Goal: Psychosocial  11/19/2020 1446 by Demetrius Alberto April M  Outcome: Resolved/Met  11/19/2020 1140 by Demetrius Alberto April M  Outcome: Progressing Towards Goal     Problem: Afib: Discharge Outcomes (not in CAT)  Goal: *Hemodynamically stable  11/19/2020 1446 by Demetrius Alberto April M  Outcome: Resolved/Met  11/19/2020 1140 by Demetrius Alberto April M  Outcome: Progressing Towards Goal  Goal: *Stable cardiac rhythm  11/19/2020 1446 by Demetrius Alberto April M  Outcome: Resolved/Met  11/19/2020 1140 by Kimberlee Alba  Outcome: Progressing Towards Goal  Goal: *Lungs clear or at baseline  11/19/2020 1446 by Momo Shafer April M  Outcome: Resolved/Met  11/19/2020 1140 by Sally Syed  Outcome: Progressing Towards Goal  Goal: *Optimal pain control at patient's stated goal  11/19/2020 1446 by Momo Shafer, April M  Outcome: Resolved/Met  11/19/2020 1140 by Momo Shafer April M  Outcome: Progressing Towards Goal  Goal: *Identifies cardiac risk factors  11/19/2020 1446 by Momo Shafer April M  Outcome: Resolved/Met  11/19/2020 1140 by Momo Shafer April M  Outcome: Progressing Towards Goal  Goal: *Verbalizes home exercise program, activity guidelines, cardiac precautions  11/19/2020 1446 by Momo Shafer April M  Outcome: Resolved/Met  11/19/2020 1140 by Momo Shafer April M  Outcome: Progressing Towards Goal  Goal: *Verbalizes understanding and describes prescribed diet  11/19/2020 1446 by Momo Shafer April M  Outcome: Resolved/Met  11/19/2020 1140 by Momo Shafer April M  Outcome: Progressing Towards Goal  Goal: *Verbalizes understanding and describes medication purposes and frequencies  11/19/2020 1446 by Momo Shafer April M  Outcome: Resolved/Met  11/19/2020 1140 by Momo Shafer April M  Outcome: Progressing Towards Goal  Goal: *Anxiety reduced or absent  11/19/2020 1446 by Momo Shafer April M  Outcome: Resolved/Met  11/19/2020 1140 by Momo Shafer April M  Outcome: Progressing Towards Goal  Goal: *Understands and describes signs and symptoms to report to providers(Stroke Metric)  11/19/2020 1446 by Momo Shafer April M  Outcome: Resolved/Met  11/19/2020 1140 by Momo Shafer April M  Outcome: Progressing Towards Goal  Goal: *Describes follow-up/return visits to physicians  11/19/2020 1446 by Momo Shafer April M  Outcome: Resolved/Met  11/19/2020 1140 by Momo Shafer April M  Outcome: Progressing Towards Goal  Goal: *Describes available resources and support systems  11/19/2020 1446 by Momo Shafer April M  Outcome: Resolved/Met  11/19/2020 1140 by Sally Syed  Outcome: Progressing Towards Goal  Goal: *Influenza immunization  11/19/2020 1446 by Tristin Wade April M  Outcome: Resolved/Met  11/19/2020 1140 by Dilcia Kaplan  Outcome: Progressing Towards Goal  Goal: *Pneumococcal immunization  11/19/2020 1446 by Tristin Wade April M  Outcome: Resolved/Met  11/19/2020 1140 by Tristin Wade April M  Outcome: Progressing Towards Goal  Goal: *Describes smoking cessation resources  11/19/2020 1446 by Tristin Wade, April M  Outcome: Resolved/Met  11/19/2020 1140 by Tristin Wade April M  Outcome: Progressing Towards Goal     Problem: Hypertension  Goal: *Blood pressure within specified parameters  11/19/2020 1446 by Tristin Wade April M  Outcome: Resolved/Met  11/19/2020 1140 by Tristin Wade April M  Outcome: Progressing Towards Goal  Goal: *Fluid volume balance  11/19/2020 1446 by Tristin Wade April M  Outcome: Resolved/Met  11/19/2020 1140 by Tristin Wade April M  Outcome: Progressing Towards Goal  Goal: *Labs within defined limits  11/19/2020 1446 by Tristin Wade April M  Outcome: Resolved/Met  11/19/2020 1140 by Tristin Wade April M  Outcome: Progressing Towards Goal     Problem: Patient Education: Go to Patient Education Activity  Goal: Patient/Family Education  11/19/2020 1446 by Tristin Wade April M  Outcome: Resolved/Met  11/19/2020 1140 by Tristin Wade April M  Outcome: Progressing Towards Goal     Problem: Discharge Planning  Goal: *Discharge to safe environment  Outcome: Resolved/Met     Problem: Patient Education: Go to Patient Education Activity  Goal: Patient/Family Education  Outcome: Resolved/Met     Problem: Patient Education: Go to Patient Education Activity  Goal: Patient/Family Education  Outcome: Resolved/Met

## 2020-11-19 NOTE — PROGRESS NOTES
Discharge instructions reviewed with patient and daughter, opportunity for questions provided, daughter verbalized understanding. Patient discharged home.

## 2020-11-19 NOTE — ACP (ADVANCE CARE PLANNING)
requested for Advance Directive (AMD) consult per In Basket request.  Patient appears a little confused. Answers some questions appropriately such as oriented to self and to place, but unclear about time, how long she has been in the hospital, and similar information. I am not convinced she is fully able to complete AMD at this time and that it would be inappropriate at this time to do so. A review of medical records supports this as well. Patient appeared comforted as a result of this visit and expressed gratitude for this visit. Will continue to follow up as needed and upon request as able. Visited by Rev. Bryanna Valencia MDiv, [facility], [facility]   paging service: [phone])

## 2020-11-19 NOTE — PROGRESS NOTES
Problem: Falls - Risk of  Goal: *Absence of Falls  Description: Document Lemon Jose Fall Risk and appropriate interventions in the flowsheet. Outcome: Progressing Towards Goal  Note: Fall Risk Interventions:  Mobility Interventions: Communicate number of staff needed for ambulation/transfer, Patient to call before getting OOB, PT Consult for mobility concerns, PT Consult for assist device competence, Utilize walker, cane, or other assistive device    Mentation Interventions: Door open when patient unattended, Reorient patient    Medication Interventions: Teach patient to arise slowly, Patient to call before getting OOB    Elimination Interventions: Call light in reach, Patient to call for help with toileting needs, Toileting schedule/hourly rounds    History of Falls Interventions: Bed/chair exit alarm, Room close to nurse's station, Investigate reason for fall, Door open when patient unattended         Problem: Patient Education: Go to Patient Education Activity  Goal: Patient/Family Education  Outcome: Progressing Towards Goal     Problem: Pressure Injury - Risk of  Goal: *Prevention of pressure injury  Description: Document Devang Scale and appropriate interventions in the flowsheet.   Outcome: Progressing Towards Goal  Note: Pressure Injury Interventions:  Sensory Interventions: Assess changes in LOC, Pressure redistribution bed/mattress (bed type)    Moisture Interventions: Absorbent underpads, Internal/External urinary devices, Limit adult briefs    Activity Interventions: Pressure redistribution bed/mattress(bed type), Increase time out of bed    Mobility Interventions: HOB 30 degrees or less, Pressure redistribution bed/mattress (bed type)    Nutrition Interventions: Document food/fluid/supplement intake    Friction and Shear Interventions: HOB 30 degrees or less, Lift sheet                Problem: Patient Education: Go to Patient Education Activity  Goal: Patient/Family Education  Outcome: Progressing Towards Goal     Problem: Patient Education: Go to Patient Education Activity  Goal: Patient/Family Education  Outcome: Progressing Towards Goal     Problem: Afib Pathway: Day 1  Goal: Off Pathway (Use only if patient is Off Pathway)  Outcome: Progressing Towards Goal  Goal: Activity/Safety  Outcome: Progressing Towards Goal  Goal: Consults, if ordered  Outcome: Progressing Towards Goal  Goal: Diagnostic Test/Procedures  Outcome: Progressing Towards Goal  Goal: Nutrition/Diet  Outcome: Progressing Towards Goal  Goal: Discharge Planning  Outcome: Progressing Towards Goal  Goal: Medications  Outcome: Progressing Towards Goal  Goal: Respiratory  Outcome: Progressing Towards Goal  Goal: Treatments/Interventions/Procedures  Outcome: Progressing Towards Goal  Goal: Psychosocial  Outcome: Progressing Towards Goal  Goal: *Optimal pain control at patient's stated goal  Outcome: Progressing Towards Goal  Goal: *Hemodynamically stable  Outcome: Progressing Towards Goal  Goal: *Stable cardiac rhythm  Outcome: Progressing Towards Goal  Goal: *Lungs clear or at baseline  Outcome: Progressing Towards Goal  Goal: *Labs within defined limits  Outcome: Progressing Towards Goal  Goal: *Describes available resources and support systems  Outcome: Progressing Towards Goal     Problem: Afib Pathway: Day 2  Goal: Off Pathway (Use only if patient is Off Pathway)  Outcome: Progressing Towards Goal  Goal: Activity/Safety  Outcome: Progressing Towards Goal  Goal: Consults, if ordered  Outcome: Progressing Towards Goal  Goal: Diagnostic Test/Procedures  Outcome: Progressing Towards Goal  Goal: Nutrition/Diet  Outcome: Progressing Towards Goal  Goal: Discharge Planning  Outcome: Progressing Towards Goal  Goal: Medications  Outcome: Progressing Towards Goal  Goal: Respiratory  Outcome: Progressing Towards Goal  Goal: Treatments/Interventions/Procedures  Outcome: Progressing Towards Goal  Goal: Psychosocial  Outcome: Progressing Towards Goal  Goal: *Hemodynamically stable  Outcome: Progressing Towards Goal  Goal: *Optimal pain control at patient's stated goal  Outcome: Progressing Towards Goal  Goal: *Stable cardiac rhythm  Outcome: Progressing Towards Goal  Goal: *Lungs clear or at baseline  Outcome: Progressing Towards Goal  Goal: *Describes available resources and support systems  Outcome: Progressing Towards Goal     Problem: Afib Pathway: Day 3  Goal: Off Pathway (Use only if patient is Off Pathway)  Outcome: Progressing Towards Goal  Goal: Activity/Safety  Outcome: Progressing Towards Goal  Goal: Diagnostic Test/Procedures  Outcome: Progressing Towards Goal  Goal: Nutrition/Diet  Outcome: Progressing Towards Goal  Goal: Discharge Planning  Outcome: Progressing Towards Goal  Goal: Medications  Outcome: Progressing Towards Goal  Goal: Respiratory  Outcome: Progressing Towards Goal  Goal: Treatments/Interventions/Procedures  Outcome: Progressing Towards Goal  Goal: Psychosocial  Outcome: Progressing Towards Goal     Problem: Afib: Discharge Outcomes (not in CAT)  Goal: *Hemodynamically stable  Outcome: Progressing Towards Goal  Goal: *Stable cardiac rhythm  Outcome: Progressing Towards Goal  Goal: *Lungs clear or at baseline  Outcome: Progressing Towards Goal  Goal: *Optimal pain control at patient's stated goal  Outcome: Progressing Towards Goal  Goal: *Identifies cardiac risk factors  Outcome: Progressing Towards Goal  Goal: *Verbalizes home exercise program, activity guidelines, cardiac precautions  Outcome: Progressing Towards Goal  Goal: *Verbalizes understanding and describes prescribed diet  Outcome: Progressing Towards Goal  Goal: *Verbalizes understanding and describes medication purposes and frequencies  Outcome: Progressing Towards Goal  Goal: *Anxiety reduced or absent  Outcome: Progressing Towards Goal  Goal: *Understands and describes signs and symptoms to report to providers(Stroke Metric)  Outcome: Progressing Towards Goal  Goal: *Describes follow-up/return visits to physicians  Outcome: Progressing Towards Goal  Goal: *Describes available resources and support systems  Outcome: Progressing Towards Goal  Goal: *Influenza immunization  Outcome: Progressing Towards Goal  Goal: *Pneumococcal immunization  Outcome: Progressing Towards Goal  Goal: *Describes smoking cessation resources  Outcome: Progressing Towards Goal     Problem: Hypertension  Goal: *Blood pressure within specified parameters  Outcome: Progressing Towards Goal  Goal: *Fluid volume balance  Outcome: Progressing Towards Goal  Goal: *Labs within defined limits  Outcome: Progressing Towards Goal     Problem: Patient Education: Go to Patient Education Activity  Goal: Patient/Family Education  Outcome: Progressing Towards Goal

## 2020-11-19 NOTE — DISCHARGE SUMMARY
Discharge Summary       PATIENT ID: Francisco Javier Crews  MRN: [id number]   YOB: 1933    DATE OF ADMISSION: 11/16/2020  8:59 PM    DATE OF DISCHARGE: 11/19/2020   PRIMARY CARE PROVIDER: Nyasia Tapia MD     DISCHARGING PROVIDER: Brenda Miranda MD    To contact this individual call [phone] and ask the  to page. If unavailable ask to be transferred the Adult Hospitalist Department. CONSULTATIONS: IP CONSULT TO CARDIOLOGY  IP CONSULT TO PSYCHIATRY    PROCEDURES/SURGERIES: * No surgery found *    ADMITTING DIAGNOSES & HOSPITAL COURSE:   Dementia with behavioral disturbance  HTN urgency - resolved  Chronic atrial fibrillation     44-year-old woman with a past medical history significant for suspected dementia, chronic atrial fibrillation on not on anticoagulation, dyslipidemia, hypertension, hypothyroidism, anxiety/depression, was in her usual state of health until a couple of days ago when the patient developed confusion in the form of hallucination at nighttime. Patient was admitted to the hospital, continue with ongoing delirium. UA was checked and negative. Urine culture was sent despite, and is still pending. Daughter was insistent that this possibly could be a UTI, so therefore was urinalysis was checked again, remains negative. She was started on ceftriaxone, while waiting urine culture which is at this time still pending. We will plan to discharge home on 3 days total of antibiotics. Suspect her hallucinations are worsening dementia with some behavioral disturbance and sundowning. Psych was consulted who recommended nighttime Remeron, which will be given as a prescription for home. DISCHARGE DIAGNOSES / PLAN:      # HTN urgency:  -started oral antihypertensives-lisinopril,metoprolol     #Suspected dementia with behavioral disturbances:  Fall PTA  -hallucinations likely related to dementia  UA negative at admission. Per daughter,her mother is not at baseline and this happens with UTI  -check UA again. -MRI brain-Atrophy and extensive chronic white matter disease, with no acute infarction. Multiple chronic microhemorrhages  -CT spine negative for fractire  -Psychiatry consulted     #Chronic atrial fib:  -rate controlled on metoprolol, not a candidate for anticoagulation per patient's cardiologist  (off pradaxa)     #Question left adnexal lesion on CT :  -abdominal USG when possible, as outpatient. ADDITIONAL CARE RECOMMENDATIONS:   - Please take all medications as prescribed. Note changes as below. **Start Keflex x2 days total.  **Decrease Cymbalta to 20 mg daily  **Start Remeron 7.5 mg at bedtime. This medication does help with sleep, and appetite, as well as depression. Your primary care doctor may increase this appropriately in a few weeks. - Please make sure to follow up with your primary care physician within 1-2 weeks of discharge for hospital follow up. - Please make sure to continue to monitor for: Sudden onset weakness, changes in speech, sudden onset numbness sudden chest pain, shortness of breath, fevers, and return to the Emergency Department with any of these symptoms:   - Please get up slowly from a seated or laying position, avoid falls. - Avoid tobacco, alcohol and other illicit drug use. PENDING TEST RESULTS:   At the time of discharge the following test results are still pending: None    FOLLOW UP APPOINTMENTS:    Follow-up Information     Follow up With Specialties Details Why Contact Info    Imelda Matthews MD Family Medicine In 1 week  [address]  [address] [facility]  [phone]               DIET: Resume previous diet    ACTIVITY: Activity as tolerated    WOUND CARE: None    EQUIPMENT needed: None      DISCHARGE MEDICATIONS:  Current Discharge Medication List      START taking these medications    Details   mirtazapine (REMERON) 7.5 mg tablet Take 1 Tab by mouth nightly for 30 days.   Qty: 30 Tab, Refills: 0 cephALEXin (Keflex) 500 mg capsule Take 1 Cap by mouth four (4) times daily for 2 days. Qty: 8 Cap, Refills: 0         CONTINUE these medications which have CHANGED    Details   DULoxetine (Cymbalta) 20 mg capsule Take 1 Cap by mouth nightly for 30 days. Qty: 30 Cap, Refills: 0         CONTINUE these medications which have NOT CHANGED    Details   polyethylene glycol (MIRALAX) 17 gram packet Take 1 packet by mouth daily as needed (constipation). Qty: 1 packet, Refills: 0      simvastatin (ZOCOR) 80 mg tablet Take 80 mg by mouth nightly. meclizine (ANTIVERT) 25 mg tablet Take 25 mg by mouth as needed. metoprolol (LOPRESSOR) 50 mg tablet Take 50 mg by mouth two (2) times a day. levothyroxine (SYNTHROID) 25 mcg tablet Take 25 mcg by mouth Daily (before breakfast). Dexlansoprazole (DEXILANT) 60 mg CpDB Take  by mouth nightly. lisinopril (PRINIVIL, ZESTRIL) 20 mg tablet Take 20 mg by mouth every morning. STOP taking these medications       escitalopram oxalate (Lexapro) 10 mg tablet Comments:   Reason for Stopping:         dabigatran etexilate (PRADAXA) 75 mg capsule Comments:   Reason for Stopping:         oxyCODONE IR (ROXICODONE) 5 mg immediate release tablet Comments:   Reason for Stopping:         FESOTERODINE FUMARATE (TOVIAZ PO) Comments:   Reason for Stopping:         ALPRAZolam (XANAX) 0.25 mg tablet Comments:   Reason for Stopping:                 NOTIFY YOUR PHYSICIAN FOR ANY OF THE FOLLOWING:   Fever over 101 degrees for 24 hours. Chest pain, shortness of breath, fever, chills, nausea, vomiting, diarrhea, change in mentation, falling, weakness, bleeding. Severe pain or pain not relieved by medications. Or, any other signs or symptoms that you may have questions about.     DISPOSITION:   X Home With:   OT  PT  HH  RN       Long term SNF/Inpatient Rehab    Independent/assisted living    Hospice    Other:       PATIENT CONDITION AT DISCHARGE:     Functional status    Poor X Deconditioned     Independent      Cognition     Lucid     Forgetful    X Dementia      Catheters/lines (plus indication)    Campbell     PICC     PEG    X None      Code status     Full code    X DNR      PHYSICAL EXAMINATION AT DISCHARGE:  Visit Vitals  /62 (BP 1 Location: Right arm, BP Patient Position: Sitting;Post activity)   Pulse 64   Temp 98.5 °F (36.9 °C)   Resp 17   Wt 51.7 kg (114 lb)   SpO2 97%   BMI 20.85 kg/m²     Gen: NAD, awake in bed, elderly female   HEENT: NC/AT, sclera anicteric, PERRL, EOMI  CV: RRR no m/r/g, normal S1 and S2, no pedal edema   Resp: CTA b/l no increased work of breathing, no wheezing or rhonchi, speaking in full sentences   Abd: NT/ND, normal bowel sounds, no rebound or guarding  Ext: 2+ pulses, no edema  Skin: No rashes or lesions  Psych: pleasantly confused.        CHRONIC MEDICAL DIAGNOSES:  Problem List as of 11/19/2020 Date Reviewed: 11/19/2020          Codes Class Noted - Resolved    Chronic atrial fibrillation ([facility] [address] ICD-10-CM: I48.20  ICD-9-CM: 427.31  11/19/2020 - Present        * (Principal) Acute delirium ICD-10-CM: R41.0  ICD-9-CM: 780.09  11/16/2020 - Present        Primary localized osteoarthrosis, pelvic region and thigh ICD-10-CM: M16.10  ICD-9-CM: 715.15  11/17/2014 - Present        DJD (degenerative joint disease) of hip ICD-10-CM: M16.9  ICD-9-CM: 715.95  3/13/2012 - Present              Greater than 30 minutes were spent with the patient on counseling and coordination of care    Signed:   Daniela Vee MD  11/19/2020  2:40 PM

## 2020-11-19 NOTE — PROGRESS NOTES
Problem: Falls - Risk of  Goal: *Absence of Falls  Description: Document Helder Aj Fall Risk and appropriate interventions in the flowsheet. Outcome: Progressing Towards Goal  Note: Fall Risk Interventions:  Pt remains free of falls during admission. Call bell and frequently used items within reach. Bedside table within reach. Pt provided nonskid socks and instructed to call out for nurse when in need of assistance. Problem: Pressure Injury - Risk of  Goal: *Prevention of pressure injury  Description: Document Devang Scale and appropriate interventions in the flowsheet. Outcome: Progressing Towards Goal  Note: Pressure Injury Interventions:  Patient turned every two hours, moisture barrier applied, heels elevated, pillows and blankets used, pressure redistributing mattress , linens dry. Problem: Hypertension  Goal: *Blood pressure within specified parameters  Outcome: Progressing Towards Goal    Bedside shift change report given to April (oncoming nurse) by Goran Laguna (offgoing nurse).  Report included the following information SBAR, Intake/Output, MAR, Recent Results, and Cardiac Rhythm Sr .

## 2020-11-19 NOTE — PROGRESS NOTES
Problem: Mobility Impaired (Adult and Pediatric)  Goal: *Acute Goals and Plan of Care (Insert Text)  Description: FUNCTIONAL STATUS PRIOR TO ADMISSION: Patient was supervision level using a rollator and rolling walker for functional mobility. HOME SUPPORT PRIOR TO ADMISSION: The patient lived with her daughter in a one level home. Daughter reports many falls, typically posterior direction. Physical Therapy Goals  Initiated 11/18/2020  1. Patient will move from supine to sit and sit to supine , scoot up and down, and roll side to side in bed with supervision/set-up within 7 day(s). 2.  Patient will transfer from bed to chair and chair to bed with supervision/set-up using the least restrictive device within 7 day(s). 3.  Patient will perform sit to stand with supervision/set-up within 7 day(s). 4.  Patient will ambulate with supervision/set-up for 50 feet with the least restrictive device within 7 day(s). 5.  Patient will ascend/descend 3 stairs with 1 handrail(s) with minimal assistance/contact guard assist within 7 day(s). Outcome: Progressing Towards Goal   PHYSICAL THERAPY TREATMENT  Patient: Pasquale Beaver (19 y.o. female)  Date: 11/19/2020  Diagnosis: Acute delirium [R41.0]   Acute delirium       Precautions: Fall, Bed Alarm, Skin(low BMI of 20.85) waffle cushion up to the chair  Chart, physical therapy assessment, plan of care and goals were reviewed. ASSESSMENT  Patient continues with skilled PT services and is progressing towards goals. Pt continues to be limited by impaired sitting and standing balance and today reported being symptomatic after amb 8 feet which was proceeded by standing long enough for a clean up (she had had a BM noted during transfer from the bed to chair). Returned her to sitting and took her BP which was stable, see chart below and she reported being less symptomatic once in sitting. She remained up to the chair post session.  Course noted, later into the day yesterday pt was more confused and became agitated (?sundowning?). She had a sitter present during our session. .       Current Level of Function Impacting Discharge (mobility/balance): min to mod assist provided     Other factors to consider for discharge: fall history      Visit Vitals  /62 (BP 1 Location: Right arm, BP Patient Position: Sitting;Post activity)   Pulse 64       Resp 17       SpO2 on room air 97%                 PLAN :  Patient continues to benefit from skilled intervention to address the above impairments. Continue treatment per established plan of care. to address goals. Recommendation for discharge: (in order for the patient to meet his/her long term goals)  Physical therapy at least 2 days/week in the home AND ensure assist and/or supervision for safety with all of mobility. If this is not an option then recommend short term rehab within a SNF    This discharge recommendation:  A follow-up discussion with the attending provider and/or case management is planned    IF patient discharges home will need the following DME: patient owns DME required for discharge       SUBJECTIVE:   Patient stated My head is swimming.     OBJECTIVE DATA SUMMARY:   Chart checked, pt cleared by nursing  Critical Behavior:  Neurologic State: Alert  Orientation Level: Oriented to person, Oriented to place(partially oriented to time (Nov 2020 and situation))  Cognition: Follows commands     Functional Mobility Training:  Bed Mobility:     Supine to Sit: Moderate assistance(but bed was malfunctioning )              Transfers:  Sit to Stand: Minimum assistance  Stand to Sit: Minimum assistance        Bed to Chair: Moderate assistance                    Balance:  Sitting: Impaired, but bed was malfunctioning   Sitting - Static: Fair (occasional)  Sitting - Dynamic: Poor (constant support)  Standing: Impaired; With support  Standing - Static: Constant support; Fair  Standing - Dynamic : Constant support; Fair  Ambulation/Gait Training:  Distance (ft): 8 Feet (ft)  Assistive Device: Gait belt;Walker, rolling  Ambulation - Level of Assistance: Minimal assistance        Gait Abnormalities: Decreased step clearance        Base of Support: Center of gravity altered;Narrowed     Speed/Ashley: Slow;Pace decreased (<100 feet/min)  Step Length: Left shortened;Right shortened                    Stairs: Therapeutic Exercises:     Pain Rating:  None rated    Activity Tolerance:   Reported feeling symptomatic but VSS, see assessment    After treatment patient left in no apparent distress:   Sitting in chair on waffle cushion, Call bell within reach, Bed / chair alarm activated, and sitter present    COMMUNICATION/COLLABORATION:   The patients plan of care was discussed with: Registered nurseChandra Lepe Line   Time Calculation: 32 mins

## 2020-11-19 NOTE — ROUTINE PROCESS
Hospital follow-up PCP transitional care appointment has been scheduled with Dr. Mayo Rapp (same office as Dr. Jesusita Marin) for Nov 23 @ 830AM. Pending patient discharge.   Babs Penn, Care Management Specialist.

## 2020-11-19 NOTE — DISCHARGE INSTRUCTIONS
Dear Flako Dyson,    Thank you for choosing [address] [place] [address] for your healthcare needs. We strive to provide EXCELLENT care to you and your family. In an effort to explain clearly why you were here in the hospital, I've also written a very brief summary below. Other details including formal diagnosis, medication changes, and follow up appointment recommendations can also be found in this packet. You were admitted for changes in mentation due to likely worsening dementia with delirium, and potentially UTI for which you were started on antibiotics, and had MRI. You also received care from specialist physicians in the following specialties:  [address]    Here are the updates to your medication list:  **Start Keflex x2 days total.  **Decrease Cymbalta to 20 mg daily  **Start Remeron 7.5 mg at bedtime. This medication does help with sleep, and appetite, as well as depression. Your primary care doctor may increase this appropriately in a few weeks. Remember that it is important for you to take your medications exactly as they are prescribed. It is helpful to keep a list of your medication with the names, dosages, and times to be taken in your wallet. Additionally,   - Please make sure to follow up with your primary care physician within 1-2 weeks of discharge for hospital follow up. - Please make sure to continue to monitor for: Sudden onset weakness, changes in speech, sudden onset numbness sudden chest pain, shortness of breath, fevers, and return to the Emergency Department with any of these symptoms:   - Please get up slowly from a seated or laying position, avoid falls. - Avoid tobacco, alcohol and other illicit drug use. Make sure to also see your primary care doctor for follow-up. Bring these papers with you and be sure to review your medication list with your doctor. I cannot stress the importance of follow up enough.  I've included the information for your follow-up appointments below: Follow-up Information     Follow up With Specialties Details Why Contact Info    Milla Ochoa MD Family Medicine In 1 week  [address]   [address]  [facility]  [phone]            At this time, the following test results are still pending:   Again, please follow-up these results with your primary care provider. Should you have any fever over 101 degrees for 24 hours, chest pain, shortness of breath, fever, chills, nausea, vomiting, diarrhea, change in mentation, falling, weakness, bleeding, or worsening pain, please seek medical attention immediately. Finally, as your discharging physician, you may be receiving a survey regarding my care. I would greatly value and appreciate your input in the survey as we strive for excellence. If you have any questions, I can be reached at [phone].   Thank you so much again for allowing me to care for you at [address] [place] [address].    Respectfully yours,  Brinda Orlando MD

## 2020-11-19 NOTE — PROGRESS NOTES
Spiritual Care Assessment/Progress Note  [facility]      NAME: Bruce Hayes      MRN: [id number]  AGE: 80 y.o.  SEX: female  Buddhist Affiliation: Hinduism   Language: English     11/19/2020     Total Time (in minutes): 10     Spiritual Assessment begun in [facility] IMCU through conversation with:         [x]Patient        [] Family    [] Friend(s)        Reason for Consult: Advance medical directive consult, Initial/Spiritual assessment, patient floor     Spiritual beliefs: (Please include comment if needed)     [x] Identifies with a mary tradition:  Hinduism       [] Supported by a mary community:            [] Claims no spiritual orientation:           [] Seeking spiritual identity:                [] Adheres to an individual form of spirituality:           [] Not able to assess:                           Identified resources for coping:      [] Prayer                               [] Music                  [] Guided Imagery     [x] Family/friends                 [] Pet visits     [] Devotional reading                         [] Unknown     [] Other:                                              Interventions offered during this visit: (See comments for more details)    Patient Interventions: Catharsis/review of pertinent events in supportive environment, Coping skills reviewed/reinforced, Iconic (affirming the presence of God/Higher Power)           Plan of Care:     [] Support spiritual and/or cultural needs    [x] Support AMD and/or advance care planning process      [] Support grieving process   [] Coordinate Rites and/or Rituals    [] Coordination with community clergy   [] No spiritual needs identified at this time   [] Detailed Plan of Care below (See Comments)  [] Make referral to Music Therapy  [] Make referral to Pet Therapy     [] Make referral to Addiction services  [] Make referral to [facility]  [] Make referral to Spiritual Care Partner  [] No future visits requested        [x] Follow up visits as needed     Comments:  visit for initial spiritual assessment and Advance Directive (AMD) consult. Patient reclining in bed, nursing staff providing care. Good eye contact, calm, friendly. Says she is feeling fine this morning. Provided spiritual presence and listening as she spoke of her present thoughts, feelings, and concerns.  requested for Advance Directive (AMD) consult per In Basket request.  Patient appears a little confused. Answers some questions appropriately such as oriented to self and to place, but unclear about time, how long she has been in the hospital, and similar information. I am not convinced she is fully able to complete AMD at this time and that it would be inappropriate at this time to do so. A review of medical records supports this as well. Patient appeared comforted as a result of this visit and expressed gratitude for this visit. Will continue to follow up as needed and upon request as able. Visited by Rev. Imelda Villarreal MDiv, [facility], [facility]   paging service: [phone])

## 2020-11-20 LAB
BACTERIA SPEC CULT: NORMAL
CC UR VC: NORMAL
SERVICE CMNT-IMP: NORMAL

## 2020-11-20 NOTE — CONSULTS
[address]    Name:  Hamilton Cunha  MR#:  [id number]  :  1933  ACCOUNT #:  [de-identified]  DATE OF SERVICE:  2020      REASON FOR CONSULTATION:  Dementia with hallucinations. HISTORY OF PRESENT ILLNESS:  The patient is an 40-year-old female who is currently being seen on the medical unit for psychiatry consultation for complaints mentioned above. Her daughter, Erik Baltazar, is also at bedside and was able to provide a meaningful history. The patient is calm and pleasant during the interview. Her past medical history is significant for chronic Afib, dyslipidemia, hypertension, hypothyroidism, anxiety, depression, and suspected dementia. She was in her usual state of health until 2 days ago when she started developing confusion and hallucination. The daughter reports the patient's urine was foul smelling prior to admission. The patient's urologist and PCP were called for an appointment, but unfortunately, she could not be seen right away. The patient was then taken to the emergency room at a local hospital where she resides. She was not taking her medications for the past 6 months. The patient has not been diagnosed with dementia. When she arrived in the emergency room at a local hospital, her troponin was noted to be elevated. She was then transferred here at [facility] for further workup. She was also treated for hypertensive urgency and UTI. Her UA was negative on admission, but her daughter reported that her mother is not at baseline when this usually happens and she has UTI. She is currently being treated with antibiotics. According to her daughter, the patient has been having delirium and hallucinations that someone was in her house. She has not been sleeping and eating. The patient also has been depressed. Her sister was diagnosed with cancer.   She also lost her , nephew, and niece this past year, plus COVID, but since she started on antibiotics, her delirium and hallucinations have been resolved. The patient tells me that she is depressed, but no thoughts of suicide. She denies homicidal ideation or auditory or visual hallucination. No confusion. PAST MEDICAL HISTORY:  See H and P. Past Medical History:   Diagnosis Date    Arrhythmia     atrial fib    Arthritis     Chronic pain     In back and right foot    GERD (gastroesophageal reflux disease)     Hypercholesterolemia     Hypertension     Other ill-defined conditions(799.89)     right ventricular \"thickening\" of heart    Other ill-defined conditions(799.89)     blood transfusion hx--2012    Psychiatric disorder     depression    Thyroid disease     Unspecified adverse effect of anesthesia     Itching       Labs: (reviewed/updated 11/20/2020)  No data found.   Labs Reviewed   TROPONIN I - Abnormal; Notable for the following components:       Result Value    Troponin-I, Qt. 0.11 (*)     All other components within normal limits   METABOLIC PANEL, COMPREHENSIVE - Abnormal; Notable for the following components:    Chloride 110 (*)     Creatinine <0.15 (*)     ALT (SGPT) 11 (*)     Protein, total 6.2 (*)     All other components within normal limits   MAGNESIUM - Abnormal; Notable for the following components:    Magnesium <0.3 (*)     All other components within normal limits   PHOSPHORUS - Abnormal; Notable for the following components:    Phosphorus <0.5 (*)     All other components within normal limits   TROPONIN I - Abnormal; Notable for the following components:    Troponin-I, Qt. 0.11 (*)     All other components within normal limits   METABOLIC PANEL, COMPREHENSIVE - Abnormal; Notable for the following components:    Creatinine 0.52 (*)     BUN/Creatinine ratio 25 (*)     ALT (SGPT) 9 (*)     Protein, total 6.1 (*)     All other components within normal limits   TROPONIN I - Abnormal; Notable for the following components:    Troponin-I, Qt. 0.11 (*)     All other components within normal limits   RENAL FUNCTION PANEL - Abnormal; Notable for the following components:    Glucose 104 (*)     Phosphorus 2.5 (*)     All other components within normal limits   HEPATIC FUNCTION PANEL - Abnormal; Notable for the following components:    Protein, total 6.2 (*)     AST (SGOT) 13 (*)     All other components within normal limits   URINALYSIS W/MICROSCOPIC - Abnormal; Notable for the following components:    Leukocyte Esterase TRACE (*)     All other components within normal limits   URINE CULTURE HOLD SAMPLE   CULTURE, URINE   CBC WITH AUTOMATED DIFF   TSH 3RD GENERATION   LIPID PANEL   AMMONIA   FOLATE   VITAMIN B12   RBC, FOLATE   TSH 3RD GENERATION   AMMONIA   FOLATE   VITAMIN B12   CBC WITH AUTOMATED DIFF   LIPID PANEL   MAGNESIUM   PHOSPHORUS   SAMPLES BEING HELD   CBC WITH AUTOMATED DIFF     Lab Results   Component Value Date/Time    Sodium 140 11/18/2020 03:30 AM    Potassium 3.8 11/18/2020 03:30 AM    Chloride 108 11/18/2020 03:30 AM    CO2 26 11/18/2020 03:30 AM    Anion gap 6 11/18/2020 03:30 AM    Glucose 104 (H) 11/18/2020 03:30 AM    BUN 13 11/18/2020 03:30 AM    Creatinine 0.69 11/18/2020 03:30 AM    BUN/Creatinine ratio 19 11/18/2020 03:30 AM    GFR est AA >60 11/18/2020 03:30 AM    GFR est non-AA >60 11/18/2020 03:30 AM    Calcium 9.8 11/18/2020 03:30 AM    Bilirubin, total 0.7 11/18/2020 03:30 AM    Alk. phosphatase 60 11/18/2020 03:30 AM    Protein, total 6.2 (L) 11/18/2020 03:30 AM    Albumin 3.7 11/18/2020 03:30 AM    Albumin 3.7 11/18/2020 03:30 AM    Globulin 2.5 11/18/2020 03:30 AM    A-G Ratio 1.5 11/18/2020 03:30 AM    ALT (SGPT) 12 11/18/2020 03:30 AM     No results displayed because visit has over 200 results.         Vitals:    11/19/20 0312 11/19/20 0701 11/19/20 1116 11/19/20 1142   BP: (!) 171/73 (!) 156/93 (!) 119/54 128/62   Pulse: 89 66 60 64   Resp: 16 16 16 17   Temp: 98.3 °F (36.8 °C) 98.3 °F (36.8 °C) 98.5 °F (36.9 °C)    SpO2: 96% 97% 97% 97% Weight: 51.7 kg (114 lb)        No results found for this or any previous visit (from the past 24 hour(s)). RADIOLOGY REPORTS:  Results from Hospital Encounter encounter on 11/16/20   XR CHEST PORT    Narrative Chest single view. Comparison single view chest October 27, 2019. Unchanged coarse reticular markings throughout the lungs. No gross interstitial  or alveolar pulmonary edema. Cardiac and mediastinal structures unchanged noting  elongated thoracic aorta with atherosclerosis. Left-sided skin fold. No  pneumothorax or sizable pleural effusion. Mri Brain Wo Cont    Result Date: 11/18/2020  PRELIMINARY REPORT No evidence of acute infarct, intracranial hemorrhage, or intracranial mass. Preliminary report was provided by Dr. Jada Olson, the on-call radiologist, on 11/18/2020 at 0245 hours. Final report to follow. END PRELIMINARY REPORTFINAL REPORT: INDICATION:   Delirium EXAMINATION:  MRI BRAIN WO CONTRAST COMPARISON:  CT November 16, 2020 TECHNIQUE:  Multiplanar multisequence acquisition without contrast of the brain. FINDINGS:  Ventricles:  Midline, no hydrocephalus. Brain Parenchyma/Brainstem:  Mild generalized atrophy. Moderate to severe confluent periventricular T2 signal abnormality. Small chronic infarction right corona radiata. No acute infarction. Intracranial Hemorrhage:  No acute hemorrhage, though there are numerous small foci of susceptibility artifact throughout the right cerebellum, ilene, midbrain, left thalamus, and frontal/parietal periphery consistent with chronic microhemorrhages. Basal Cisterns:  Normal. Flow Voids:  Normal. Additional Comments:  N/A. IMPRESSION: Atrophy and extensive chronic white matter disease, with no acute infarction. Multiple chronic microhemorrhages as above. Ct Head Wo Cont    Result Date: 11/16/2020  CT head. Comparison CT head September 21, 2020. Axial images are reviewed along with reformatted sagittal/coronal images.  Dose reduction: All CT scans at this facility are performed using dose reduction optimization techniques as appropriate to a performed exam including the following- automated exposure control, adjustments of mA and/or Kv according to patient size, or use of iterative reconstructive technique. . Bone windows demonstrate normal aeration imaged sinuses and mastoid air cells. Prior ophthalmologic surgery. Review of intracranial content reveals ventricular and sulcal prominence related to cerebral atrophy. There is same distribution low density through periventricular/subcortical white matter related to gliosis as part of advanced nonacute end vessel occlusive change. Central cerebral arteriosclerosis. No intracranial hemorrhage. Impression: Periventricular/subcortical gliosis related to nonacute end vessel occlusive change. Similar findings compared to CT head 9/21/2020. No intracranial hemorrhage. Ct Head Wo Cont    Result Date: 9/21/2020  Study: Head CT without contrast. Clinical indication: Fall, head injury. Technique: Routine volume acquisition of the head was obtained without IV contrast. Coronal and sagittal reformations were generated in soft tissue and bone kernels. Dose reduction: All CT scans at this facility are performed using dose reduction optimization techniques as appropriate to a performed exam including the following: Automated exposure control, adjustments of the mA and/or kV according to patient size, or use of iterative reconstruction technique. Comparison: Head CT 9/15/2015. Findings: Generalized cerebral volume loss with compensatory prominence of the ventricular system. No evidence of acute intrarenal hemorrhage, mass effect, midline shift or abnormal extra-axial fluid collection. Prominent periventricular and subcortical white matter hypodensities bilaterally, suggesting chronic small vessel ischemic change. Gray-white matter differentiation is maintained. Remote infarction in the right basal ganglia.  Moderate vascular calcifications. No evidence of skull fracture. Ventricles and basal cisterns are preserved and are symmetric. Globes and orbits are intact. Impression: No acute intracranial abnormality. Chronic changes as above. Ct Spine Cerv Wo Cont    Result Date: 11/17/2020  INDICATION:  fall, neck tenderness STUDY:  CT SPINE CERV WO CONT Examination: Cervical spine CT. No contrast. Comparison 9/21/2020. Thin section axial images were obtained with sagittal and coronal reformats. CT dose reduction was achieved through use of a standardized protocol tailored for this examination and automatic exposure control for dose modulation. FINDINGS: Slight anterolisthesis C3 on C4 and C4 on C5, degenerative in nature. There is no bony destructive lesion or evidence of acute fracture. There are vascular calcifications. Multilevel degenerative changes and again noted. This has not progressed. IMPRESSION: 1. No acute abnormality      Ct Spine Cerv Wo Cont    Result Date: 9/21/2020  Study: CT of the cervical spine without contrast. Clinical indication: Fall, head injury. Technique: CT volume acquisition through the cervical spine was obtained. Axial, sagittal, coronal images were provided in bone and soft tissue kernels. Dose reduction: All CT scans at this facility are performed using dose reduction optimization techniques as appropriate to a performed exam including the following: Automated exposure control, adjustments of the mA and/or kV according to patient size, or use of iterative reconstruction technique. Comparison: CT cervical spine 1/27/2011. Findings: Straightening of the normal cervical lordosis. Alignment is otherwise anatomic. Vertebral body heights are maintained. Moderate degenerative disc disease with multilevel disc height loss, endplate changes and anterior/posterior osteophytes. No prevertebral soft tissue swelling. Severe carotid atherosclerosis. Visualized lung apices are clear.      Impression: Moderate degenerative disc disease. No evidence of acute traumatic injury of the cervical spine. Ct Pelv Wo Cont    Result Date: 11/17/2020  INDICATION:  fall EXAM: CT pelvis. Comparison 9/8/2020. Thin section axial images were obtained. From these sagittal and coronal reformats were performed. CT dose reduction was achieved through use of a standardized protocol tailored for this examination and automatic exposure control for dose modulation. FINDINGS: Bones are osteopenic. Alignment is normal. There are bilateral hip replacements. No evidence of periprosthetic fracture or loosening. Left acetabular component is rotated posteriorly There are vascular calcifications. Diverticulosis of the sigmoid colon without evidence of acute diverticulitis There are chronic bilateral L5 pars defects with significant degenerative changes at L4-5 and L5-S1 High density lobular lesion in the left adnexa measuring approximately 2.8 cm. May represent an ovary     IMPRESSION: 1. No acute fracture 2. Chronic L5 pars defects 3. Question left adnexal lesion. Consider pelvic ultrasound      Xr Chest Port    Result Date: 11/16/2020  Chest single view. Comparison single view chest October 27, 2019. Unchanged coarse reticular markings throughout the lungs. No gross interstitial or alveolar pulmonary edema. Cardiac and mediastinal structures unchanged noting elongated thoracic aorta with atherosclerosis. Left-sided skin fold. No pneumothorax or sizable pleural effusion. PAST PSYCHIATRIC HISTORY:  The patient denies prior psychiatric history, but states that she has been feeling depressed after she lost her . MEDICATIONS:  She is currently on Cymbalta which has been prescribed by her primary care physician. PSYCHOSOCIAL HISTORY:  She is a . She has 2 children. MENTAL STATUS EXAMINATION:  The patient is alert and oriented in all spheres. She is dressed in hospital apparel. She is calm and pleasant during the interview. She reports her mood is okay. Affect is blunted. Speech, normal rate and rhythm. Thought process, logical and goal directed. She denies suicidal ideation, homicidal ideation, auditory or visual hallucination. Memory seems intact. Intelligence seems average. Insight is partial.  Judgment is fair. ASSESSMENT AND PLAN:  The patient meets the criteria for adjustment disorder with depressed mood. Continue Cymbalta at current dosing. I will start her on Remeron 7.5 mg to help with appetite, sleep, and mood. Follow up with her outpatient provider after discharge. There is no indication for inpatient psychiatric admission. Please discharge to home once medically stable. Thank you for this kind consult. Please call with questions.       SHELLY PABLO NP      [id number]  D:  11/19/2020 14:59  T:  11/19/2020 19:26  JOB #:  [id number]

## 2021-03-24 ENCOUNTER — HOSPITAL ENCOUNTER (INPATIENT)
Age: 86
LOS: 3 days | Discharge: HOME HEALTH | DRG: 281 | End: 2021-03-27
Attending: EMERGENCY MEDICINE | Admitting: HOSPITALIST
Payer: MEDICARE

## 2021-03-24 ENCOUNTER — APPOINTMENT (OUTPATIENT)
Dept: GENERAL RADIOLOGY | Age: 86
DRG: 281 | End: 2021-03-24
Attending: EMERGENCY MEDICINE
Payer: MEDICARE

## 2021-03-24 DIAGNOSIS — F03.90 DEMENTIA WITHOUT BEHAVIORAL DISTURBANCE, UNSPECIFIED DEMENTIA TYPE: ICD-10-CM

## 2021-03-24 DIAGNOSIS — R77.8 ELEVATED TROPONIN: ICD-10-CM

## 2021-03-24 DIAGNOSIS — I48.20 CHRONIC ATRIAL FIBRILLATION (HCC): ICD-10-CM

## 2021-03-24 DIAGNOSIS — I50.9 ACUTE CONGESTIVE HEART FAILURE, UNSPECIFIED HEART FAILURE TYPE (HCC): Primary | ICD-10-CM

## 2021-03-24 DIAGNOSIS — N17.9 ACUTE KIDNEY INJURY (HCC): ICD-10-CM

## 2021-03-24 LAB
APPEARANCE UR: ABNORMAL
BACTERIA URNS QL MICRO: NEGATIVE /HPF
BASOPHILS # BLD: 0.1 K/UL (ref 0–0.1)
BASOPHILS NFR BLD: 1 % (ref 0–1)
BILIRUB UR QL: NEGATIVE
BNP SERPL-MCNC: 4130 PG/ML
COLOR UR: ABNORMAL
DIFFERENTIAL METHOD BLD: ABNORMAL
EOSINOPHIL # BLD: 0.2 K/UL (ref 0–0.4)
EOSINOPHIL NFR BLD: 2 % (ref 0–7)
ERYTHROCYTE [DISTWIDTH] IN BLOOD BY AUTOMATED COUNT: 15.5 % (ref 11.5–14.5)
GLUCOSE UR STRIP.AUTO-MCNC: NEGATIVE MG/DL
HCT VFR BLD AUTO: 37.8 % (ref 35–47)
HGB BLD-MCNC: 12.1 G/DL (ref 11.5–16)
HGB UR QL STRIP: ABNORMAL
IMM GRANULOCYTES # BLD AUTO: 0.2 K/UL (ref 0–0.04)
IMM GRANULOCYTES NFR BLD AUTO: 2 % (ref 0–0.5)
KETONES UR QL STRIP.AUTO: 15 MG/DL
LEUKOCYTE ESTERASE UR QL STRIP.AUTO: ABNORMAL
LYMPHOCYTES # BLD: 2.6 K/UL (ref 0.8–3.5)
LYMPHOCYTES NFR BLD: 23 % (ref 12–49)
MAGNESIUM SERPL-MCNC: 2 MG/DL (ref 1.6–2.4)
MCH RBC QN AUTO: 31.9 PG (ref 26–34)
MCHC RBC AUTO-ENTMCNC: 32 G/DL (ref 30–36.5)
MCV RBC AUTO: 99.7 FL (ref 80–99)
MONOCYTES # BLD: 1 K/UL (ref 0–1)
MONOCYTES NFR BLD: 8 % (ref 5–13)
MUCOUS THREADS URNS QL MICRO: ABNORMAL /LPF
NEUTS SEG # BLD: 7.4 K/UL (ref 1.8–8)
NEUTS SEG NFR BLD: 64 % (ref 32–75)
NITRITE UR QL STRIP.AUTO: NEGATIVE
PH UR STRIP: 5 [PH] (ref 5–8)
PLATELET # BLD AUTO: 238 K/UL (ref 150–400)
PMV BLD AUTO: 9.1 FL (ref 8.9–12.9)
PROT UR STRIP-MCNC: 30 MG/DL
RBC # BLD AUTO: 3.79 M/UL (ref 3.8–5.2)
RBC #/AREA URNS HPF: ABNORMAL /HPF (ref 0–5)
SP GR UR REFRACTOMETRY: 1.02 (ref 1–1.03)
TROPONIN I SERPL-MCNC: 0.08 NG/ML
TROPONIN I SERPL-MCNC: 0.08 NG/ML
UA: UC IF INDICATED,UAUC: ABNORMAL
UROBILINOGEN UR QL STRIP.AUTO: 0.1 EU/DL (ref 0.2–1)
WBC # BLD AUTO: 11.4 K/UL (ref 3.6–11)
WBC URNS QL MICRO: ABNORMAL /HPF (ref 0–4)

## 2021-03-24 PROCEDURE — 36415 COLL VENOUS BLD VENIPUNCTURE: CPT

## 2021-03-24 PROCEDURE — 71045 X-RAY EXAM CHEST 1 VIEW: CPT

## 2021-03-24 PROCEDURE — 84484 ASSAY OF TROPONIN QUANT: CPT

## 2021-03-24 PROCEDURE — 93005 ELECTROCARDIOGRAM TRACING: CPT

## 2021-03-24 PROCEDURE — 65270000029 HC RM PRIVATE

## 2021-03-24 PROCEDURE — 74011250636 HC RX REV CODE- 250/636: Performed by: HOSPITALIST

## 2021-03-24 PROCEDURE — 83735 ASSAY OF MAGNESIUM: CPT

## 2021-03-24 PROCEDURE — 81001 URINALYSIS AUTO W/SCOPE: CPT

## 2021-03-24 PROCEDURE — 80053 COMPREHEN METABOLIC PANEL: CPT

## 2021-03-24 PROCEDURE — 99282 EMERGENCY DEPT VISIT SF MDM: CPT

## 2021-03-24 PROCEDURE — 74011250637 HC RX REV CODE- 250/637: Performed by: HOSPITALIST

## 2021-03-24 PROCEDURE — 83880 ASSAY OF NATRIURETIC PEPTIDE: CPT

## 2021-03-24 PROCEDURE — 85025 COMPLETE CBC W/AUTO DIFF WBC: CPT

## 2021-03-24 RX ORDER — PANTOPRAZOLE SODIUM 40 MG/1
40 TABLET, DELAYED RELEASE ORAL
Status: DISCONTINUED | OUTPATIENT
Start: 2021-03-25 | End: 2021-03-27 | Stop reason: HOSPADM

## 2021-03-24 RX ORDER — MIRTAZAPINE 15 MG/1
TABLET, FILM COATED ORAL
COMMUNITY
Start: 2021-03-01

## 2021-03-24 RX ORDER — METOPROLOL TARTRATE 50 MG/1
50 TABLET ORAL 2 TIMES DAILY
Status: DISCONTINUED | OUTPATIENT
Start: 2021-03-24 | End: 2021-03-27 | Stop reason: HOSPADM

## 2021-03-24 RX ORDER — ATORVASTATIN CALCIUM 40 MG/1
40 TABLET, FILM COATED ORAL
Status: DISCONTINUED | OUTPATIENT
Start: 2021-03-24 | End: 2021-03-27 | Stop reason: HOSPADM

## 2021-03-24 RX ORDER — FUROSEMIDE 10 MG/ML
40 INJECTION INTRAMUSCULAR; INTRAVENOUS DAILY
Status: DISCONTINUED | OUTPATIENT
Start: 2021-03-25 | End: 2021-03-27 | Stop reason: HOSPADM

## 2021-03-24 RX ORDER — ALENDRONATE SODIUM 70 MG/1
TABLET ORAL
COMMUNITY

## 2021-03-24 RX ORDER — LANOLIN ALCOHOL/MO/W.PET/CERES
3 CREAM (GRAM) TOPICAL
Status: DISCONTINUED | OUTPATIENT
Start: 2021-03-24 | End: 2021-03-27 | Stop reason: HOSPADM

## 2021-03-24 RX ORDER — HEPARIN SODIUM 10000 [USP'U]/ML
5000 INJECTION, SOLUTION INTRAVENOUS; SUBCUTANEOUS EVERY 8 HOURS
Status: DISCONTINUED | OUTPATIENT
Start: 2021-03-24 | End: 2021-03-24

## 2021-03-24 RX ORDER — MIRTAZAPINE 15 MG/1
15 TABLET, FILM COATED ORAL
Status: DISCONTINUED | OUTPATIENT
Start: 2021-03-24 | End: 2021-03-27 | Stop reason: HOSPADM

## 2021-03-24 RX ORDER — HEPARIN SODIUM 5000 [USP'U]/ML
5000 INJECTION, SOLUTION INTRAVENOUS; SUBCUTANEOUS EVERY 8 HOURS
Status: DISCONTINUED | OUTPATIENT
Start: 2021-03-24 | End: 2021-03-27 | Stop reason: HOSPADM

## 2021-03-24 RX ORDER — FUROSEMIDE 40 MG/1
TABLET ORAL
COMMUNITY

## 2021-03-24 RX ORDER — LEVOTHYROXINE SODIUM 25 UG/1
25 TABLET ORAL
Status: DISCONTINUED | OUTPATIENT
Start: 2021-03-25 | End: 2021-03-27 | Stop reason: HOSPADM

## 2021-03-24 RX ORDER — MECLIZINE HYDROCHLORIDE 25 MG/1
25 TABLET ORAL
Status: DISCONTINUED | OUTPATIENT
Start: 2021-03-24 | End: 2021-03-27 | Stop reason: HOSPADM

## 2021-03-24 RX ADMIN — HEPARIN SODIUM 5000 UNITS: 5000 INJECTION INTRAVENOUS; SUBCUTANEOUS at 20:37

## 2021-03-24 RX ADMIN — ATORVASTATIN CALCIUM 40 MG: 40 TABLET, FILM COATED ORAL at 20:16

## 2021-03-24 RX ADMIN — METOPROLOL TARTRATE 50 MG: 50 TABLET, FILM COATED ORAL at 20:14

## 2021-03-24 RX ADMIN — MELATONIN TAB 3 MG 3 MG: 3 TAB at 20:15

## 2021-03-24 RX ADMIN — MIRTAZAPINE 15 MG: 15 TABLET, FILM COATED ORAL at 20:15

## 2021-03-24 NOTE — ED PROVIDER NOTES
EMERGENCY DEPARTMENT HISTORY AND PHYSICAL EXAM      Date: 3/24/2021  Patient Name: Fabiola Wilson    History of Presenting Illness     Chief Complaint   Patient presents with    Shortness of Breath       History Provided By: Patient and Patient's Daughter    HPI: Fabiola Wilson, 80 y.o. female with a past medical history significant for dementia, chronic atrial fibrillation, arthritis, hyperlipidemia, hypertension, depression, anxiety, hypothyroidism presents to the emergency department today with a 3-day history of shortness of breath and dyspnea on exertion per the patient's daughter. Patient does have dementia so history is limited. However, at this time, she does not complain of chest pain. The daughter has not noticed any nausea or vomiting. No diarrhea. The patient has not complained to her of abdominal pain. She does not seem more confused today. However, her daughter states that she has been having a more difficult time with dementia over the past 2 weeks. Patient's daughter states her caretaker took her blood pressure at home today at it was 145/100. There are no other complaints, changes, or physical findings at this time. PCP: Rishabh Guardado MD    No current facility-administered medications on file prior to encounter. Current Outpatient Medications on File Prior to Encounter   Medication Sig Dispense Refill    alendronate (FOSAMAX) 70 mg tablet alendronate 70 mg tablet      furosemide (LASIX) 40 mg tablet furosemide 40 mg tablet      mirtazapine (REMERON) 15 mg tablet       polyethylene glycol (MIRALAX) 17 gram packet Take 1 packet by mouth daily as needed (constipation). 1 packet 0    simvastatin (ZOCOR) 80 mg tablet Take 80 mg by mouth nightly.  meclizine (ANTIVERT) 25 mg tablet Take 25 mg by mouth as needed.  metoprolol (LOPRESSOR) 50 mg tablet Take 50 mg by mouth two (2) times a day.       levothyroxine (SYNTHROID) 25 mcg tablet Take 25 mcg by mouth Daily (before breakfast).  Dexlansoprazole (DEXILANT) 60 mg CpDB Take  by mouth nightly.  lisinopril (PRINIVIL, ZESTRIL) 20 mg tablet Take 20 mg by mouth every morning. Past History     Past Medical History:  Past Medical History:   Diagnosis Date    Arrhythmia     atrial fib    Arthritis     Atrial fibrillation (HCC)     Chronic pain     In back and right foot    GERD (gastroesophageal reflux disease)     Hypercholesterolemia     Hypertension     Other ill-defined conditions(799.89)     right ventricular \"thickening\" of heart    Other ill-defined conditions(799.89)     blood transfusion hx--    Psychiatric disorder     depression    Thyroid disease     Unspecified adverse effect of anesthesia     Itching       Past Surgical History:  Past Surgical History:   Procedure Laterality Date    HX CATARACT REMOVAL      bilateral    HX OPEN CHOLECYSTECTOMY      HX ORTHOPAEDIC      Right Foot Surgery x 6    HX ORTHOPAEDIC      plate in foot    HX ORTHOPAEDIC      HX ORTHOPAEDIC  3/12/12     RIGHT TOTAL HIP REPLACEMENT    HX OTHER SURGICAL      hemorrhoidectomy    HX TONSILLECTOMY         Family History:  Family History   Problem Relation Age of Onset    Breast Cancer Sister 61    Cancer Sister         breast cancer       Social History:  Social History     Tobacco Use    Smoking status: Former Smoker     Packs/day: 1.00     Years: 5.00     Pack years: 5.00     Quit date: 11/10/1991     Years since quittin.3    Smokeless tobacco: Never Used   Substance Use Topics    Alcohol use: Never     Frequency: Never    Drug use: Never       Allergies: Allergies   Allergen Reactions    Macrobid [Nitrofurantoin Monohyd/M-Cryst] Other (comments)     Needed to be placed on life support after taking with coumadin    Codeine Nausea Only    Lidocaine Itching    Morphine Itching         Review of Systems     Review of Systems   Constitutional: Negative for chills and fever.    HENT: Negative for congestion and sore throat. Eyes: Negative for pain and redness. Respiratory: Positive for shortness of breath. Negative for cough. Cardiovascular: Negative for chest pain and leg swelling. Gastrointestinal: Negative for abdominal pain, diarrhea, nausea and vomiting. Endocrine: Negative for cold intolerance and heat intolerance. Genitourinary: Negative for dysuria, frequency, hematuria and urgency. Musculoskeletal: Negative for arthralgias and myalgias. Skin: Negative for pallor and rash. Neurological: Negative for dizziness, numbness and headaches. Psychiatric/Behavioral: Positive for hallucinations. Negative for agitation and dysphoric mood. Physical Exam     Physical Exam  Vitals signs and nursing note reviewed. Constitutional:       General: She is not in acute distress. Appearance: She is well-developed. She is not ill-appearing, toxic-appearing or diaphoretic. HENT:      Head: Normocephalic and atraumatic. Nose: Nose normal.      Mouth/Throat:      Mouth: Mucous membranes are moist.      Pharynx: Oropharynx is clear. No oropharyngeal exudate or posterior oropharyngeal erythema. Eyes:      Extraocular Movements: Extraocular movements intact. Conjunctiva/sclera: Conjunctivae normal.      Pupils: Pupils are equal, round, and reactive to light. Neck:      Musculoskeletal: Neck supple. No neck rigidity or muscular tenderness. Cardiovascular:      Rate and Rhythm: Normal rate and regular rhythm. Heart sounds: Normal heart sounds. No murmur. No friction rub. No gallop. Pulmonary:      Effort: Pulmonary effort is normal. No respiratory distress. Breath sounds: Normal breath sounds. No wheezing, rhonchi or rales. Abdominal:      General: Abdomen is flat. There is no distension. Palpations: Abdomen is soft. Tenderness: There is no abdominal tenderness. Musculoskeletal: Normal range of motion. General: No swelling or tenderness. Lymphadenopathy:      Cervical: No cervical adenopathy. Skin:     General: Skin is warm and dry. Findings: No erythema. Neurological:      General: No focal deficit present. Mental Status: She is alert and oriented to person, place, and time. Mental status is at baseline. Cranial Nerves: No cranial nerve deficit. Motor: No weakness. Psychiatric:         Mood and Affect: Mood normal.         Behavior: Behavior normal.         Diagnostic Study Results     Labs -     Recent Results (from the past 12 hour(s))   URINALYSIS W/ REFLEX CULTURE    Collection Time: 03/24/21 11:00 AM    Specimen: Urine   Result Value Ref Range    Color Yellow/Straw      Appearance Hazy (A) Clear      Specific gravity 1.020 1.003 - 1.030      pH (UA) 5.0 5.0 - 8.0      Protein 30 (A) Negative mg/dL    Glucose Negative Negative mg/dL    Ketone 15 (A) Negative mg/dL    Bilirubin Negative Negative      Blood Small (A) Negative      Urobilinogen 0.1 (L) 0.2 - 1.0 EU/dL    Nitrites Negative Negative      Leukocyte Esterase Small (A) Negative      WBC 0-4 0 - 4 /hpf    RBC 0-5 0 - 5 /hpf    Bacteria Negative Negative /hpf    UA:UC IF INDICATED Culture not indicated by UA result Culture not indicated by UA result      Mucus Trace (A) Negative /lpf   CBC WITH AUTOMATED DIFF    Collection Time: 03/24/21 11:00 AM   Result Value Ref Range    WBC 11.4 (H) 3.6 - 11.0 K/uL    RBC 3.79 (L) 3.80 - 5.20 M/uL    HGB 12.1 11.5 - 16.0 g/dL    HCT 37.8 35.0 - 47.0 %    MCV 99.7 (H) 80.0 - 99.0 FL    MCH 31.9 26.0 - 34.0 PG    MCHC 32.0 30.0 - 36.5 g/dL    RDW 15.5 (H) 11.5 - 14.5 %    PLATELET 348 258 - 675 K/uL    MPV 9.1 8.9 - 12.9 FL    NEUTROPHILS 64 32 - 75 %    LYMPHOCYTES 23 12 - 49 %    MONOCYTES 8 5 - 13 %    EOSINOPHILS 2 0 - 7 %    BASOPHILS 1 0 - 1 %    IMMATURE GRANULOCYTES 2 (H) 0.0 - 0.5 %    ABS. NEUTROPHILS 7.4 1.8 - 8.0 K/UL    ABS. LYMPHOCYTES 2.6 0.8 - 3.5 K/UL    ABS. MONOCYTES 1.0 0.0 - 1.0 K/UL    ABS.  EOSINOPHILS 0.2 0.0 - 0.4 K/UL    ABS. BASOPHILS 0.1 0.0 - 0.1 K/UL    ABS. IMM. GRANS. 0.2 (H) 0.00 - 0.04 K/UL    DF AUTOMATED     METABOLIC PANEL, COMPREHENSIVE    Collection Time: 03/24/21 11:00 AM   Result Value Ref Range    Sodium 144 136 - 145 mmol/L    Potassium 4.4 3.5 - 5.1 mmol/L    Chloride 111 (H) 97 - 108 mmol/L    CO2 21 21 - 32 mmol/L    Anion gap 12 5 - 15 mmol/L    Glucose 109 (H) 65 - 100 mg/dL    BUN 39 (H) 6 - 20 mg/dL    Creatinine 1.49 (H) 0.55 - 1.02 mg/dL    BUN/Creatinine ratio 26 (H) 12 - 20      GFR est AA 40 (L) >60 ml/min/1.73m2    GFR est non-AA 33 (L) >60 ml/min/1.73m2    Calcium 10.2 (H) 8.5 - 10.1 mg/dL    Bilirubin, total 0.5 0.2 - 1.0 mg/dL    AST (SGOT) 42 (H) 15 - 37 U/L    ALT (SGPT) 35 12 - 78 U/L    Alk. phosphatase 46 45 - 117 U/L    Protein, total 7.2 6.4 - 8.2 g/dL    Albumin 3.5 3.5 - 5.0 g/dL    Globulin 3.7 2.0 - 4.0 g/dL    A-G Ratio 0.9 (L) 1.1 - 2.2     TROPONIN I    Collection Time: 03/24/21 11:00 AM   Result Value Ref Range    Troponin-I, Qt. 0.08 (H) <0.05 ng/mL   BNP    Collection Time: 03/24/21 11:00 AM   Result Value Ref Range    NT pro-BNP 4,130 (H) <450 pg/mL   TROPONIN I    Collection Time: 03/24/21  1:45 PM   Result Value Ref Range    Troponin-I, Qt. 0.08 (H) <0.05 ng/mL       Radiologic Studies -   @lastxrresult@  CT Results  (Last 48 hours)    None        CXR Results  (Last 48 hours)               03/24/21 1115  XR CHEST PORT Final result    Narrative:  Chest single view        Comparison single view chest November 16, 2020. Lungs clear. No interstitial or alveolar pulmonary edema. Cardiac and   mediastinal structures unchanged. No pneumothorax or sizable pleural effusion. Medical Decision Making   I am the first provider for this patient. I reviewed the vital signs, available nursing notes, past medical history, past surgical history, family history and social history. Vital Signs-Reviewed the patient's vital signs.   Patient Vitals for the past 12 hrs:   Temp Pulse Resp BP SpO2   03/24/21 1354  71 20 136/75 99 %   03/24/21 1058 98.3 °F (36.8 °C) 74 22 (!) 145/75 100 %       Records Reviewed: Nursing Notes and Old Medical Records        Provider Notes (Medical Decision Making):   EKG was obtained on arrival which revealed atrial fibrillation, ventricular rate of 79, nonspecific ST and T wave changes, no acute ischemic changes, no STEMI. Troponin was elevated 0.08. Of note, the patient's troponin was consistently 0.11 during her admission November 2020. Chest x-ray was negative for any acute findings. The patient was noted to have an elevated BUN and creatinine (39/1.49) compared to previous levels. This may be due to recent lasix use and/or decrease in po intake. BNP elevated 4130. Patient's progressively worsening shortness of breath over the past 3 days is likely secondary to congestive heart failure. I discussed the findings with the patient and her daughter. The patient will be admitted to the hospitalist service at [facility]. MDM         ED Course:   Initial assessment performed. The patients presenting problems have been discussed, and they are in agreement with the care plan formulated and outlined with them. I have encouraged them to ask questions as they arise throughout their visit. PROCEDURES  Procedures           Diagnosis     Clinical Impression:   1. Acute congestive heart failure, unspecified heart failure type (HCC)    2. Elevated troponin    3. Acute kidney injury ([facility] [address]    4. Chronic atrial fibrillation (HCC)    5.  Dementia without behavioral disturbance, unspecified dementia type ([facility] [address]

## 2021-03-24 NOTE — ED TRIAGE NOTES
Pt presents with c/o SOB for 2 days, worse with exertion. Daughter present and also concerned about HR and BP. Denies any CP, nausea, fever, or dizziness.

## 2021-03-24 NOTE — H&P
History and Physical    Subjective:   Chief Complaint : shortness of breath since 3 days  Source of information : patient and daughter    History of present illness:     80F, H/o chronic Afib, CHF unknown EF, with shortness of breath since 3 days    Mostly on exertion, intermittent, relieved on rest, associated with orthopnea and lower extremity swelling.  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     Past Medical History:   Diagnosis Date    Arrhythmia     atrial fib    Arthritis     Atrial fibrillation (HCC)     Chronic pain     In back and right foot    GERD (gastroesophageal reflux disease)     Hypercholesterolemia     Hypertension     Other ill-defined conditions(799.89)     right ventricular \"thickening\" of heart    Other ill-defined conditions(799.89)     blood transfusion hx--    Psychiatric disorder     depression    Thyroid disease     Unspecified adverse effect of anesthesia     Itching     Past Surgical History:   Procedure Laterality Date    HX CATARACT REMOVAL      bilateral    HX OPEN CHOLECYSTECTOMY      HX ORTHOPAEDIC      Right Foot Surgery x 6    HX ORTHOPAEDIC      plate in foot    HX ORTHOPAEDIC      HX ORTHOPAEDIC  3/12/12     RIGHT TOTAL HIP REPLACEMENT    HX OTHER SURGICAL      hemorrhoidectomy    HX TONSILLECTOMY       Family History   Problem Relation Age of Onset    Breast Cancer Sister 61    Cancer Sister         breast cancer      Social History     Tobacco Use    Smoking status: Former Smoker     Packs/day: 1.00     Years: 5.00     Pack years: 5.00     Quit date: 11/10/1991     Years since quittin.3    Smokeless tobacco: Never Used   Substance Use Topics    Alcohol use: Never     Frequency: Never       Prior to Admission medications    Medication Sig Start Date End Date Taking? Authorizing Provider   alendronate (FOSAMAX) 70 mg tablet alendronate 70 mg tablet    Other, MD Nitin   furosemide (LASIX) 40 mg tablet furosemide 40 mg tablet    Other, MD Nitin   mirtazapine (REMERON) 15 mg tablet  3/1/21   Other, MD Nitin   polyethylene glycol (MIRALAX) 17 gram packet Take 1 packet by mouth daily as needed (constipation). 11/20/14   Sagrario Smith NP   simvastatin (ZOCOR) 80 mg tablet Take 80 mg by mouth nightly. Provider, Historical   meclizine (ANTIVERT) 25 mg tablet Take 25 mg by mouth as needed. Provider, Historical   metoprolol (LOPRESSOR) 50 mg tablet Take 50 mg by mouth two (2) times a day. Provider, Historical   levothyroxine (SYNTHROID) 25 mcg tablet Take 25 mcg by mouth Daily (before breakfast). Provider, Historical   Dexlansoprazole (DEXILANT) 60 mg CpDB Take  by mouth nightly. Provider, Historical   lisinopril (PRINIVIL, ZESTRIL) 20 mg tablet Take 20 mg by mouth every morning. Provider, Historical     Allergies   Allergen Reactions    Macrobid [Nitrofurantoin Monohyd/M-Cryst] Other (comments)     Needed to be placed on life support after taking with coumadin    Codeine Nausea Only    Lidocaine Itching    Morphine Itching             Review of Systems:  Review of Systems   Constitutional: Negative for chills and fever. HENT: Negative for congestion and sore throat. Eyes: Negative for pain and redness. Respiratory: Positive for shortness of breath. Negative for cough. Cardiovascular: Negative for chest pain and leg swelling. Gastrointestinal: Negative for abdominal pain, diarrhea, nausea and vomiting. Endocrine: Negative for cold intolerance and heat intolerance. Genitourinary: Negative for dysuria, frequency, hematuria and urgency. Musculoskeletal: Negative for arthralgias and myalgias. Skin: Negative for pallor and rash. Neurological: Negative for dizziness, numbness and headaches. Psychiatric/Behavioral: Positive for hallucinations. Negative for agitation and dysphoric mood. Vitals:     Visit Vitals  /75 (BP 1 Location: Left upper arm, BP Patient Position: At rest)   Pulse 71   Temp 98.3 °F (36.8 °C)   Resp 20   Ht 5' 2\" (1.575 m)   Wt 57.6 kg (127 lb)   SpO2 99%   BMI 23.23 kg/m²       Physical Exam:   Physical Exam  Vitals signs and nursing note reviewed. Constitutional:       General: She is not in acute distress. Appearance: She is well-developed. She is not ill-appearing, toxic-appearing or diaphoretic. HENT:      Head: Normocephalic and atraumatic. Nose: Nose normal.      Mouth/Throat:      Mouth: Mucous membranes are moist.      Pharynx: Oropharynx is clear. No oropharyngeal exudate or posterior oropharyngeal erythema. Eyes:      Extraocular Movements: Extraocular movements intact. Conjunctiva/sclera: Conjunctivae normal.      Pupils: Pupils are equal, round, and reactive to light. Neck:      Musculoskeletal: Neck supple. No neck rigidity or muscular tenderness. Cardiovascular:      Rate and Rhythm: Normal rate and regular rhythm. Heart sounds: Normal heart sounds. No murmur. No friction rub. No gallop. Pulmonary:      Effort: Pulmonary effort is normal. No respiratory distress. Breath sounds: Normal breath sounds. No wheezing, rhonchi or rales. Abdominal:      General: Abdomen is flat. There is no distension. Palpations: Abdomen is soft. Tenderness: There is no abdominal tenderness. Musculoskeletal: Normal range of motion. General: No swelling or tenderness. Lymphadenopathy:      Cervical: No cervical adenopathy. Skin:     General: Skin is warm and dry. Findings: No erythema. Neurological:      General: No focal deficit present. Mental Status: She is alert and oriented to person, place, and time. Mental status is at baseline. Cranial Nerves: No cranial nerve deficit. Motor: No weakness. Psychiatric:         Mood and Affect: Mood normal.         Behavior: Behavior normal.            Data Review:   Recent Results (from the past 24 hour(s))   URINALYSIS W/ REFLEX CULTURE    Collection Time: 03/24/21 11:00 AM    Specimen: Urine   Result Value Ref Range    Color Yellow/Straw      Appearance Hazy (A) Clear      Specific gravity 1.020 1.003 - 1.030      pH (UA) 5.0 5.0 - 8.0      Protein 30 (A) Negative mg/dL    Glucose Negative Negative mg/dL    Ketone 15 (A) Negative mg/dL    Bilirubin Negative Negative      Blood Small (A) Negative      Urobilinogen 0.1 (L) 0.2 - 1.0 EU/dL    Nitrites Negative Negative      Leukocyte Esterase Small (A) Negative      WBC 0-4 0 - 4 /hpf    RBC 0-5 0 - 5 /hpf    Bacteria Negative Negative /hpf    UA:UC IF INDICATED Culture not indicated by UA result Culture not indicated by UA result      Mucus Trace (A) Negative /lpf   CBC WITH AUTOMATED DIFF    Collection Time: 03/24/21 11:00 AM   Result Value Ref Range    WBC 11.4 (H) 3.6 - 11.0 K/uL    RBC 3.79 (L) 3.80 - 5.20 M/uL    HGB 12.1 11.5 - 16.0 g/dL    HCT 37.8 35.0 - 47.0 %    MCV 99.7 (H) 80.0 - 99.0 FL    MCH 31.9 26.0 - 34.0 PG    MCHC 32.0 30.0 - 36.5 g/dL    RDW 15.5 (H) 11.5 - 14.5 %    PLATELET 781 590 - 787 K/uL    MPV 9.1 8.9 - 12.9 FL    NEUTROPHILS 64 32 - 75 %    LYMPHOCYTES 23 12 - 49 %    MONOCYTES 8 5 - 13 %    EOSINOPHILS 2 0 - 7 %    BASOPHILS 1 0 - 1 %    IMMATURE GRANULOCYTES 2 (H) 0.0 - 0.5 %    ABS. NEUTROPHILS 7.4 1.8 - 8.0 K/UL    ABS. LYMPHOCYTES 2.6 0.8 - 3.5 K/UL    ABS. MONOCYTES 1.0 0.0 - 1.0 K/UL    ABS. EOSINOPHILS 0.2 0.0 - 0.4 K/UL    ABS. BASOPHILS 0.1 0.0 - 0.1 K/UL    ABS. IMM.  GRANS. 0.2 (H) 0.00 - 0.04 K/UL    DF AUTOMATED     METABOLIC PANEL, COMPREHENSIVE    Collection Time: 03/24/21 11:00 AM   Result Value Ref Range    Sodium 144 136 - 145 mmol/L    Potassium 4.4 3.5 - 5.1 mmol/L    Chloride 111 (H) 97 - 108 mmol/L CO2 21 21 - 32 mmol/L    Anion gap 12 5 - 15 mmol/L    Glucose 109 (H) 65 - 100 mg/dL    BUN 39 (H) 6 - 20 mg/dL    Creatinine 1.49 (H) 0.55 - 1.02 mg/dL    BUN/Creatinine ratio 26 (H) 12 - 20      GFR est AA 40 (L) >60 ml/min/1.73m2    GFR est non-AA 33 (L) >60 ml/min/1.73m2    Calcium 10.2 (H) 8.5 - 10.1 mg/dL    Bilirubin, total 0.5 0.2 - 1.0 mg/dL    AST (SGOT) 42 (H) 15 - 37 U/L    ALT (SGPT) 35 12 - 78 U/L    Alk. phosphatase 46 45 - 117 U/L    Protein, total 7.2 6.4 - 8.2 g/dL    Albumin 3.5 3.5 - 5.0 g/dL    Globulin 3.7 2.0 - 4.0 g/dL    A-G Ratio 0.9 (L) 1.1 - 2.2     TROPONIN I    Collection Time: 03/24/21 11:00 AM   Result Value Ref Range    Troponin-I, Qt. 0.08 (H) <0.05 ng/mL   BNP    Collection Time: 03/24/21 11:00 AM   Result Value Ref Range    NT pro-BNP 4,130 (H) <450 pg/mL   TROPONIN I    Collection Time: 03/24/21  1:45 PM   Result Value Ref Range    Troponin-I, Qt. 0.08 (H) <0.05 ng/mL             Assessment and Plan :     (1) AECHF unknown EF: lasix 40 IV daily, ECHO. Order magnesium. (3) MODE : CRS. Continue lasix. (4) pAFIB not on AC. (5)  NSTEMI : likely type II s/t 1,2: ECHO for evaluation of akinesia, consult cardiology    (6) hypercalcemia, albumin 3.5: likely over use of alendronatye/ calccium for osteoporosis. Order PTH to evaluate. 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      DVT ppx: heparin SubQ    Dispo:  once stable    Signed By: Dorinda Eisenmenger, MD     March 24, 2021

## 2021-03-24 NOTE — Clinical Note
Status[de-identified] INPATIENT [101]   Type of Bed: Telemetry [19]   Inpatient Hospitalization Certified Necessary for the Following Reasons: 3.  Patient receiving treatment that can only be provided in an inpatient setting (further clarification in H&P documentation)   Admitting Diagnosis: CHF (congestive heart failure) [facility]) [[id number]]   Admitting Physician: Cruz Jesus [[id number]]   Attending Physician: Mary Martinez [[id number]]   Estimated Length of Stay: 2 Midnights   Discharge Plan[de-identified] Home with Office Follow-up

## 2021-03-25 LAB
ALBUMIN SERPL-MCNC: 3.5 G/DL (ref 3.5–5)
ALBUMIN/GLOB SERPL: 0.9 {RATIO} (ref 1.1–2.2)
ALP SERPL-CCNC: 46 U/L (ref 45–117)
ALT SERPL-CCNC: 35 U/L (ref 12–78)
ANION GAP SERPL CALC-SCNC: 12 MMOL/L (ref 5–15)
AST SERPL W P-5'-P-CCNC: 42 U/L (ref 15–37)
BILIRUB SERPL-MCNC: 0.5 MG/DL (ref 0.2–1)
BUN SERPL-MCNC: 39 MG/DL (ref 6–20)
BUN/CREAT SERPL: 26 (ref 12–20)
CA-I BLD-MCNC: 10 MG/DL (ref 8.5–10.1)
CHLORIDE SERPL-SCNC: 111 MMOL/L (ref 97–108)
CO2 SERPL-SCNC: 21 MMOL/L (ref 21–32)
CREAT SERPL-MCNC: 1.49 MG/DL (ref 0.55–1.02)
GLOBULIN SER CALC-MCNC: 3.7 G/DL (ref 2–4)
GLUCOSE SERPL-MCNC: 109 MG/DL (ref 65–100)
POTASSIUM SERPL-SCNC: 4.4 MMOL/L (ref 3.5–5.1)
PROT SERPL-MCNC: 7.2 G/DL (ref 6.4–8.2)
SODIUM SERPL-SCNC: 144 MMOL/L (ref 136–145)

## 2021-03-25 PROCEDURE — 65270000029 HC RM PRIVATE

## 2021-03-25 PROCEDURE — 74011250636 HC RX REV CODE- 250/636: Performed by: HOSPITALIST

## 2021-03-25 PROCEDURE — 74011000258 HC RX REV CODE- 258: Performed by: HOSPITALIST

## 2021-03-25 PROCEDURE — 74011250637 HC RX REV CODE- 250/637: Performed by: HOSPITALIST

## 2021-03-25 RX ORDER — ACETAMINOPHEN 325 MG/1
650 TABLET ORAL
Status: DISCONTINUED | OUTPATIENT
Start: 2021-03-25 | End: 2021-03-27 | Stop reason: HOSPADM

## 2021-03-25 RX ADMIN — MIRTAZAPINE 15 MG: 15 TABLET, FILM COATED ORAL at 20:56

## 2021-03-25 RX ADMIN — ATORVASTATIN CALCIUM 40 MG: 40 TABLET, FILM COATED ORAL at 20:56

## 2021-03-25 RX ADMIN — PANTOPRAZOLE SODIUM 40 MG: 40 TABLET, DELAYED RELEASE ORAL at 08:33

## 2021-03-25 RX ADMIN — HEPARIN SODIUM 5000 UNITS: 5000 INJECTION INTRAVENOUS; SUBCUTANEOUS at 12:06

## 2021-03-25 RX ADMIN — HEPARIN SODIUM 5000 UNITS: 5000 INJECTION INTRAVENOUS; SUBCUTANEOUS at 05:59

## 2021-03-25 RX ADMIN — HEPARIN SODIUM 5000 UNITS: 5000 INJECTION INTRAVENOUS; SUBCUTANEOUS at 20:56

## 2021-03-25 RX ADMIN — FUROSEMIDE 40 MG: 10 INJECTION, SOLUTION INTRAMUSCULAR; INTRAVENOUS at 09:00

## 2021-03-25 RX ADMIN — ACETAMINOPHEN 650 MG: 325 TABLET, FILM COATED ORAL at 13:20

## 2021-03-25 RX ADMIN — METOPROLOL TARTRATE 50 MG: 50 TABLET, FILM COATED ORAL at 20:56

## 2021-03-25 RX ADMIN — METOPROLOL TARTRATE 50 MG: 50 TABLET, FILM COATED ORAL at 08:33

## 2021-03-25 RX ADMIN — PIPERACILLIN AND TAZOBACTAM 3.38 G: 3; .375 INJECTION, POWDER, FOR SOLUTION INTRAVENOUS at 11:16

## 2021-03-25 RX ADMIN — MELATONIN TAB 3 MG 3 MG: 3 TAB at 20:56

## 2021-03-25 RX ADMIN — PIPERACILLIN AND TAZOBACTAM 3.38 G: 3; .375 INJECTION, POWDER, FOR SOLUTION INTRAVENOUS at 18:26

## 2021-03-25 RX ADMIN — LEVOTHYROXINE SODIUM 25 MCG: 0.03 TABLET ORAL at 08:33

## 2021-03-25 NOTE — PROGRESS NOTES
Reason for admission: SOB    RUR score: 14% Low    DME: rollator, cane, walker    PCP: Dr. Lori Oppenheim; patient had appointment a few weeks ago    Pharmacy: [address]: (daughter) Jose Anderson [phone] and (son) Evgeny West [phone]    AMD: Full code    Primary Decision Maker: (daughter) Jose Anderson [phone]    Transition of Care: home    Patient reported that she had been living alone. Patient reported that her   on 2021. Patient reported that her daughter Jose Anderson [phone]) recently moved in with to her home. Patient's daughter lives with patient in a one story home; there are 3 steps to access home. Patient uses a cane, rollator, and walker. Patient has no history of falling and drives. Patient has never received HH or SNF. Patient had an appointment with her PCP a few weeks ago.     Advance Care Planning   Healthcare Decision Maker:       Primary Decision Maker: Austin Ash [facility] - [phone]    Click here to complete [employer] including selection of the Healthcare Decision Maker Relationship (ie \"Primary\")

## 2021-03-25 NOTE — CONSULTS
Consult    NAME: Elsa Olsen   :  3/3/1933   MRN:  [id number]     Date/Time:  3/25/2021 10:26 AM    Patient PCP: Yahaira Overton MD  ________________________________________________________________________     Problem List:   -Paroxysmal atrial fibrillation  -Hypertension  -Dyslipidemia  -Heart failure with preserved ejection fraction as of 2020  -Arthritis  -GERD      Assessment/Plan:   -25-year-old white female well-known to me from my clinic admitted to the hospital with shortness of breath.  -Patient has no known established coronary artery disease and normal known invasive cardiovascular testing in previous years.  -Normal ejection fraction of 65% in 2020 and Holter monitor done at the same time shows no malignant arrhythmias but paroxysmal atrial fibrillation.  -Patient was admitted to the hospital with shortness of breath and currently feeling fine.  -Telemetry reveals atrial fibrillation with heart rate between 71 to 83 bpm with stable blood pressure around 121/ 5/70 5 mmHg  -Based on my overall cardiac assessment I will be conservative and continue current conservative medical management with no further cardiovascular testing or any invasive cardiac management at this time.  -We will continue our goal for rate control. Thank you for consultation and letting me participate in the care of our mutual patient. []        High complexity decision making was performed      Patient Active Problem List   Diagnosis Code    DJD (degenerative joint disease) of hip M16.9    Primary localized osteoarthrosis, pelvic region and thigh M16.10    Acute delirium R41.0    Chronic atrial fibrillation ([facility] [address] I48.20    CHF (congestive heart failure) ([facility]) I50.9        Subjective:   CHIEF COMPLAINT:     HISTORY OF PRESENT ILLNESS:     Nidhi Anton is a 80 y.o.  female who who is well-known to me from my office admitted to the hospital with shortness of breath.   Patient has no known established coronary artery disease but has been diagnosed with paroxysmal atrial fibrillation and normal ejection fraction of 65% as of December 2020 and as well as Holter monitoring for 48 hours reveals no malignant cardiac arrhythmias or high-grade AV cami block but paroxysmal atrial fibrillation. Patient currently lying comfortably in the bed rhythm is atrial fibrillation heart rate in 70s and 80s in no acute distress other than anxiety secondary to her daughter death in the past.  Based on my overall assessment I will be conservative for rate controlled and no further cardiovascular testing as it would not change my cardiac management. We were asked to consult for work up and evaluation of the above problems.      Past Medical History:   Diagnosis Date    Arrhythmia     atrial fib    Arthritis     Atrial fibrillation (HCC)     Chronic pain     In back and right foot    GERD (gastroesophageal reflux disease)     Hypercholesterolemia     Hypertension     Other ill-defined conditions(799.89)     right ventricular \"thickening\" of heart    Other ill-defined conditions(799.89)     blood transfusion hx--2012    Psychiatric disorder     depression    Thyroid disease     Unspecified adverse effect of anesthesia     Itching      Past Surgical History:   Procedure Laterality Date    HX CATARACT REMOVAL      bilateral    HX OPEN CHOLECYSTECTOMY      HX ORTHOPAEDIC      Right Foot Surgery x 6    HX ORTHOPAEDIC      plate in foot    HX ORTHOPAEDIC      HX ORTHOPAEDIC  3/12/12     RIGHT TOTAL HIP REPLACEMENT    HX OTHER SURGICAL      hemorrhoidectomy    HX TONSILLECTOMY       Allergies   Allergen Reactions    Macrobid [Nitrofurantoin Monohyd/M-Cryst] Other (comments)     Needed to be placed on life support after taking with coumadin    Codeine Nausea Only    Lidocaine Itching    Morphine Itching      Meds:  See below  Social History     Tobacco Use    Smoking status: Former Smoker Packs/day: 1.00     Years: 5.00     Pack years: 5.00     Quit date: 11/10/1991     Years since quittin.3    Smokeless tobacco: Never Used   Substance Use Topics    Alcohol use: Never     Frequency: Never      Family History   Problem Relation Age of Onset    Breast Cancer Sister 61    Cancer Sister         breast cancer       REVIEW OF SYSTEMS:     []         Unable to obtain  ROS due to ---   [x]         Total of 12 systems reviewed as follows:    Constitutional: negative fever, negative chills, negative weight loss  Eyes:   negative visual changes  ENT:   negative sore throat, tongue or lip swelling  Respiratory:  negative cough, negative dyspnea  Cards:  negative for chest pain, palpitations, lower extremity edema  GI:   negative for nausea, vomiting, diarrhea, and abdominal pain  Genitourinary: negative for frequency, dysuria  Integument:  negative for rash   Hematologic:  negative for easy bruising and gum/nose bleeding  Musculoskel: negative for myalgias,  back pain  Neurological:  negative for headaches, dizziness, vertigo, weakness  Behavl/Psych: negative for feelings of anxiety, depression     Pertinent Positives include :    Objective:      Physical Exam:    Last 24hrs VS reviewed since prior progress note. Most recent are:    Visit Vitals  /74 (BP 1 Location: Left upper arm, BP Patient Position: At rest;Supine)   Pulse 75   Temp 99.1 °F (37.3 °C)   Resp 18   Ht 5' 2\" (1.575 m)   Wt 57.6 kg (127 lb)   SpO2 98%   BMI 23.23 kg/m²       Intake/Output Summary (Last 24 hours) at 3/25/2021 1026  Last data filed at 3/25/2021 1016  Gross per 24 hour   Intake    Output 201 ml   Net -201 ml        General Appearance: Well developed, well nourished, alert & oriented x 3,    no acute distress. Ears/Nose/Mouth/Throat: Pupils equal and round, Hearing grossly normal.  Neck: Supple. JVP within normal limits. Carotids good upstrokes, with no bruit.   Chest: Lungs clear to auscultation bilaterally. Cardiovascular: Irregularly irregular. No S3-S4 murmur or gallop. Abdomen: Soft, non-tender, bowel sounds are active. No organomegaly. Extremities: No edema bilaterally. Femoral pulses +2, Distal Pulses +1. Skin: Warm and dry. Neuro: CN II-XII grossly intact, Strength and sensation grossly intact. []         Post-cath site without hematoma, bruit, tenderness, or thrill. Distal pulses intact. XR CHEST PORT   Final Result           Data:      Telemetry:    EKG:  []  No new EKG for review. Prior to Admission medications    Medication Sig Start Date End Date Taking? Authorizing Provider   alendronate (FOSAMAX) 70 mg tablet alendronate 70 mg tablet    Other, MD Nitin   furosemide (LASIX) 40 mg tablet furosemide 40 mg tablet    Other, MD Nitin   mirtazapine (REMERON) 15 mg tablet  3/1/21   Other, MD Nitin   polyethylene glycol (MIRALAX) 17 gram packet Take 1 packet by mouth daily as needed (constipation). 11/20/14   Corona Davis NP   simvastatin (ZOCOR) 80 mg tablet Take 80 mg by mouth nightly. Provider, Historical   meclizine (ANTIVERT) 25 mg tablet Take 25 mg by mouth as needed. Provider, Historical   metoprolol (LOPRESSOR) 50 mg tablet Take 50 mg by mouth two (2) times a day. Provider, Historical   levothyroxine (SYNTHROID) 25 mcg tablet Take 25 mcg by mouth Daily (before breakfast). Provider, Historical   Dexlansoprazole (DEXILANT) 60 mg CpDB Take  by mouth nightly. Provider, Historical   lisinopril (PRINIVIL, ZESTRIL) 20 mg tablet Take 20 mg by mouth every morning.     Provider, Historical       Recent Results (from the past 24 hour(s))   URINALYSIS W/ REFLEX CULTURE    Collection Time: 03/24/21 11:00 AM    Specimen: Urine   Result Value Ref Range    Color Yellow/Straw      Appearance Hazy (A) Clear      Specific gravity 1.020 1.003 - 1.030      pH (UA) 5.0 5.0 - 8.0      Protein 30 (A) Negative mg/dL    Glucose Negative Negative mg/dL    Ketone 15 (A) Negative mg/dL    Bilirubin Negative Negative      Blood Small (A) Negative      Urobilinogen 0.1 (L) 0.2 - 1.0 EU/dL    Nitrites Negative Negative      Leukocyte Esterase Small (A) Negative      WBC 0-4 0 - 4 /hpf    RBC 0-5 0 - 5 /hpf    Bacteria Negative Negative /hpf    UA:UC IF INDICATED Culture not indicated by UA result Culture not indicated by UA result      Mucus Trace (A) Negative /lpf   CBC WITH AUTOMATED DIFF    Collection Time: 03/24/21 11:00 AM   Result Value Ref Range    WBC 11.4 (H) 3.6 - 11.0 K/uL    RBC 3.79 (L) 3.80 - 5.20 M/uL    HGB 12.1 11.5 - 16.0 g/dL    HCT 37.8 35.0 - 47.0 %    MCV 99.7 (H) 80.0 - 99.0 FL    MCH 31.9 26.0 - 34.0 PG    MCHC 32.0 30.0 - 36.5 g/dL    RDW 15.5 (H) 11.5 - 14.5 %    PLATELET 679 807 - 116 K/uL    MPV 9.1 8.9 - 12.9 FL    NEUTROPHILS 64 32 - 75 %    LYMPHOCYTES 23 12 - 49 %    MONOCYTES 8 5 - 13 %    EOSINOPHILS 2 0 - 7 %    BASOPHILS 1 0 - 1 %    IMMATURE GRANULOCYTES 2 (H) 0.0 - 0.5 %    ABS. NEUTROPHILS 7.4 1.8 - 8.0 K/UL    ABS. LYMPHOCYTES 2.6 0.8 - 3.5 K/UL    ABS. MONOCYTES 1.0 0.0 - 1.0 K/UL    ABS. EOSINOPHILS 0.2 0.0 - 0.4 K/UL    ABS. BASOPHILS 0.1 0.0 - 0.1 K/UL    ABS. IMM. GRANS. 0.2 (H) 0.00 - 0.04 K/UL    DF AUTOMATED     METABOLIC PANEL, COMPREHENSIVE    Collection Time: 03/24/21 11:00 AM   Result Value Ref Range    Sodium 144 136 - 145 mmol/L    Potassium 4.4 3.5 - 5.1 mmol/L    Chloride 111 (H) 97 - 108 mmol/L    CO2 21 21 - 32 mmol/L    Anion gap 12 5 - 15 mmol/L    Glucose 109 (H) 65 - 100 mg/dL    BUN 39 (H) 6 - 20 mg/dL    Creatinine 1.49 (H) 0.55 - 1.02 mg/dL    BUN/Creatinine ratio 26 (H) 12 - 20      GFR est AA 40 (L) >60 ml/min/1.73m2    GFR est non-AA 33 (L) >60 ml/min/1.73m2    Calcium 10.0 8.5 - 10.1 mg/dL    Bilirubin, total 0.5 0.2 - 1.0 mg/dL    AST (SGOT) 42 (H) 15 - 37 U/L    ALT (SGPT) 35 12 - 78 U/L    Alk.  phosphatase 46 45 - 117 U/L    Protein, total 7.2 6.4 - 8.2 g/dL    Albumin 3.5 3.5 - 5.0 g/dL    Globulin 3.7 2.0 - 4.0 g/dL A-G Ratio 0.9 (L) 1.1 - 2.2     TROPONIN I    Collection Time: 03/24/21 11:00 AM   Result Value Ref Range    Troponin-I, Qt. 0.08 (H) <0.05 ng/mL   BNP    Collection Time: 03/24/21 11:00 AM   Result Value Ref Range    NT pro-BNP 4,130 (H) <450 pg/mL   TROPONIN I    Collection Time: 03/24/21  1:45 PM   Result Value Ref Range    Troponin-I, Qt. 0.08 (H) <0.05 ng/mL   MAGNESIUM    Collection Time: 03/24/21  5:43 PM   Result Value Ref Range    Magnesium 2.0 1.6 - 2.4 mg/dL        Divina Colon MD

## 2021-03-25 NOTE — PROGRESS NOTES
Problem: Falls - Risk of  Goal: *Absence of Falls  Description: [facility] and appropriate interventions in the flowsheet.   Outcome: Progressing Towards Goal  Note: Fall Risk Interventions:

## 2021-03-25 NOTE — PROGRESS NOTES
Progress Note    Patient: Unruly Lamar MRN: [id number]  SSN: [id number]    YOB: 1933  Age: 80 y.o. Sex: female      Admit Date: 3/24/2021    LOS: 1 day     Subjective:        88F, H/o chronic Afib, CHF unknown EF, with shortness of breath since 3 days     Mostly on exertion, intermittent, relieved on rest, associated with orthopnea and lower extremity swelling. Daughter got concerned and presented here     Denies chest pain, syncope, palpitations or dysuria, compliant with meds     FSER: lasix and transferd here     Chronically, has h/o  stroke complicated by vascular dementia, no residual focal deficits,mostly spends her day sitting, but does walk with walker and one person assist    Review of Systems:  Review of Systems:  Review of Systems   Constitutional: Negative for chills and fever. HENT: Negative for congestion and sore throat.    Eyes: Negative for pain and redness. Respiratory: Positive for shortness of breath. Negative for cough.    Cardiovascular: Negative for chest pain and leg swelling. Gastrointestinal: Negative for abdominal pain, diarrhea, nausea and vomiting. Endocrine: Negative for cold intolerance and heat intolerance. Genitourinary: Negative for dysuria, frequency, hematuria and urgency. Musculoskeletal: Negative for arthralgias and myalgias. Skin: Negative for pallor and rash. Neurological: Negative for dizziness, numbness and headaches. Psychiatric/Behavioral: Positive for hallucinations.  Negative for agitation and dysphoric mood.     Objective:     Vitals:    03/25/21 0800 03/25/21 0904 03/25/21 1109 03/25/21 1200   BP:  134/74     Pulse: 72 75  75   Resp:  18     Temp:  99.1 °F (37.3 °C)     SpO2:  98%     Weight:   59.3 kg (130 lb 11.7 oz)    Height:            Intake and Output:  Current Shift: 03/25 0701 - 03/25 1900  In: -   Out: 1   Last three shifts: 03/23 1901 - 03/25 0700  In: -   Out: 200 [Urine:200]      Physical Exam:   Physical Exam  Vitals signs and nursing note reviewed. Constitutional:       General: She is not in acute distress.     Appearance: She is well-developed. She is not ill-appearing, toxic-appearing or diaphoretic. HENT:      Head: Normocephalic and atraumatic.      Nose: Nose normal.      Mouth/Throat:      Mouth: Mucous membranes are moist.      Pharynx: Oropharynx is clear. No oropharyngeal exudate or posterior oropharyngeal erythema. Eyes:      Extraocular Movements: Extraocular movements intact.      Conjunctiva/sclera: Conjunctivae normal.      Pupils: Pupils are equal, round, and reactive to light. Neck:      Musculoskeletal: Neck supple. No neck rigidity or muscular tenderness. Cardiovascular:      Rate and Rhythm: Normal rate and regular rhythm.      Heart sounds: Normal heart sounds. No murmur. No friction rub. No gallop.    Pulmonary:      Effort: Pulmonary effort is normal. No respiratory distress.      Breath sounds: Normal breath sounds. No wheezing, rhonchi or rales. Abdominal:      General: Abdomen is flat. There is no distension.      Palpations: Abdomen is soft.      Tenderness: There is no abdominal tenderness. Musculoskeletal: Normal range of motion.         General: No swelling or tenderness. Lymphadenopathy:      Cervical: No cervical adenopathy. Skin:     General: Skin is warm and dry.      Findings: No erythema. Neurological:      General: No focal deficit present.      Mental Status: She is alert and oriented to person, place, and time. Mental status is at baseline.      Cranial Nerves: No cranial nerve deficit.      Motor: No weakness. Psychiatric:         Mood and Affect: Mood normal.         Behavior: Behavior normal.       Lab/Data Review:  Recent Results (from the past 24 hour(s))   MAGNESIUM    Collection Time: 03/24/21  5:43 PM   Result Value Ref Range    Magnesium 2.0 1.6 - 2.4 mg/dL         Assessment and plan:      (1) AECHF unknown EF: lasix 40 IV daily, ECHO.  Order magnesium.      (3) MODE : CRS. Continue lasix.     (4) pAFIB not on AC.      (5)  NSTEMI : likely type II s/t 1,2: ECHO for evaluation of akinesia, consult cardiology     (6) hypercalcemia, albumin 3.5: likely over use of alendronatye/ calccium for osteoporosis.  Order PTH to evaluate.      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      DVT ppx: heparin SubQ     Dispo:  once stable    Signed By: Fdaumo Granda MD     March 25, 2021

## 2021-03-26 ENCOUNTER — APPOINTMENT (OUTPATIENT)
Dept: NON INVASIVE DIAGNOSTICS | Age: 86
DRG: 281 | End: 2021-03-26
Attending: HOSPITALIST
Payer: MEDICARE

## 2021-03-26 LAB
ANION GAP SERPL CALC-SCNC: 11 MMOL/L (ref 5–15)
BUN SERPL-MCNC: 51 MG/DL (ref 6–20)
BUN/CREAT SERPL: 31 (ref 12–20)
CA-I BLD-MCNC: 9.7 MG/DL (ref 8.5–10.1)
CHLORIDE SERPL-SCNC: 110 MMOL/L (ref 97–108)
CO2 SERPL-SCNC: 20 MMOL/L (ref 21–32)
CREAT SERPL-MCNC: 1.62 MG/DL (ref 0.55–1.02)
ECHO AO ROOT DIAM: 3.2 CM
ECHO AV PEAK GRADIENT: 6 MMHG
ECHO EST RA PRESSURE: 3 MMHG
ECHO LA AREA 4C: 26.19 CM2
ECHO LA MAJOR AXIS: 4.9 CM
ECHO LA MINOR AXIS: 3.07 CM
ECHO LV E' SEPTAL VELOCITY: 3.12 CM/S
ECHO LV EDV A2C: 60.2 CM3
ECHO LV EJECTION FRACTION BIPLANE: 64.5 % (ref 55–100)
ECHO LV ESV A2C: 16.8 CM3
ECHO LV INTERNAL DIMENSION DIASTOLIC: 3.92 CM (ref 3.9–5.3)
ECHO LV INTERNAL DIMENSION SYSTOLIC: 2.56 CM
ECHO LV IVSD: 1.38 CM (ref 0.6–0.9)
ECHO LV MASS 2D: 193 G (ref 67–162)
ECHO LV MASS INDEX 2D: 120.9 G/M2 (ref 43–95)
ECHO LV POSTERIOR WALL DIASTOLIC: 1.33 CM (ref 0.6–0.9)
ECHO LVOT PEAK GRADIENT: 2 MMHG
ECHO MV AREA PHT: 3.79 CM2
ECHO MV PRESSURE HALF TIME (PHT): 58 MS
ECHO PV PEAK INSTANTANEOUS GRADIENT SYSTOLIC: 4 MMHG
ECHO PV REGURGITANT MAX VELOCITY: 125 CM/S
ECHO PV REGURGITANT MAX VELOCITY: 77.1 CM/S
ECHO PV REGURGITANT MAX VELOCITY: 98 CM/S
ECHO RA AREA 4C: 19.27 CM2
ECHO RIGHT VENTRICULAR SYSTOLIC PRESSURE (RVSP): 23 MMHG
ECHO RV INTERNAL DIMENSION: 2.71 CM
ECHO TV MAX VELOCITY: 223 CM/S
ECHO TV REGURGITANT PEAK GRADIENT: 20 MMHG
GLUCOSE SERPL-MCNC: 83 MG/DL (ref 65–100)
MV DEC SLOPE: 6020 MM/S2
MV DEC SLOPE: 6020 MM/S2
POTASSIUM SERPL-SCNC: 4.2 MMOL/L (ref 3.5–5.1)
PROCALCITONIN SERPL-MCNC: <0.05 NG/ML
SODIUM SERPL-SCNC: 141 MMOL/L (ref 136–145)

## 2021-03-26 PROCEDURE — 97116 GAIT TRAINING THERAPY: CPT

## 2021-03-26 PROCEDURE — 80048 BASIC METABOLIC PNL TOTAL CA: CPT

## 2021-03-26 PROCEDURE — 87086 URINE CULTURE/COLONY COUNT: CPT

## 2021-03-26 PROCEDURE — 74011250636 HC RX REV CODE- 250/636: Performed by: HOSPITALIST

## 2021-03-26 PROCEDURE — 84145 PROCALCITONIN (PCT): CPT

## 2021-03-26 PROCEDURE — 65270000029 HC RM PRIVATE

## 2021-03-26 PROCEDURE — 97161 PT EVAL LOW COMPLEX 20 MIN: CPT

## 2021-03-26 PROCEDURE — 74011000258 HC RX REV CODE- 258: Performed by: HOSPITALIST

## 2021-03-26 PROCEDURE — 93306 TTE W/DOPPLER COMPLETE: CPT

## 2021-03-26 PROCEDURE — 74011250637 HC RX REV CODE- 250/637: Performed by: HOSPITALIST

## 2021-03-26 PROCEDURE — 87106 FUNGI IDENTIFICATION YEAST: CPT

## 2021-03-26 RX ADMIN — HEPARIN SODIUM 5000 UNITS: 5000 INJECTION INTRAVENOUS; SUBCUTANEOUS at 12:34

## 2021-03-26 RX ADMIN — ATORVASTATIN CALCIUM 40 MG: 40 TABLET, FILM COATED ORAL at 20:55

## 2021-03-26 RX ADMIN — HEPARIN SODIUM 5000 UNITS: 5000 INJECTION INTRAVENOUS; SUBCUTANEOUS at 20:55

## 2021-03-26 RX ADMIN — FUROSEMIDE 40 MG: 10 INJECTION, SOLUTION INTRAMUSCULAR; INTRAVENOUS at 09:00

## 2021-03-26 RX ADMIN — METOPROLOL TARTRATE 50 MG: 50 TABLET, FILM COATED ORAL at 09:35

## 2021-03-26 RX ADMIN — PIPERACILLIN AND TAZOBACTAM 3.38 G: 3; .375 INJECTION, POWDER, FOR SOLUTION INTRAVENOUS at 18:20

## 2021-03-26 RX ADMIN — MIRTAZAPINE 15 MG: 15 TABLET, FILM COATED ORAL at 20:55

## 2021-03-26 RX ADMIN — LEVOTHYROXINE SODIUM 25 MCG: 0.03 TABLET ORAL at 05:35

## 2021-03-26 RX ADMIN — HEPARIN SODIUM 5000 UNITS: 5000 INJECTION INTRAVENOUS; SUBCUTANEOUS at 05:35

## 2021-03-26 RX ADMIN — PIPERACILLIN AND TAZOBACTAM 3.38 G: 3; .375 INJECTION, POWDER, FOR SOLUTION INTRAVENOUS at 02:57

## 2021-03-26 RX ADMIN — PIPERACILLIN AND TAZOBACTAM 3.38 G: 3; .375 INJECTION, POWDER, FOR SOLUTION INTRAVENOUS at 09:38

## 2021-03-26 RX ADMIN — PANTOPRAZOLE SODIUM 40 MG: 40 TABLET, DELAYED RELEASE ORAL at 05:35

## 2021-03-26 RX ADMIN — MELATONIN TAB 3 MG 3 MG: 3 TAB at 20:55

## 2021-03-26 NOTE — PROGRESS NOTES
Progress Note    Patient: Rama Boyce MRN: [id number]  SSN: [id number]    YOB: 1933  Age: 80 y.o. Sex: female      Admit Date: 3/24/2021    LOS: 2 days     Subjective:        88F, H/o chronic Afib, CHF unknown EF, with shortness of breath since 3 days s/t AECHF and UTI. Appears better, on room air, afebrile    PT today, dc tomorrow as per PT          Denies chest pain, syncope, palpitations or dysuria, compliant with meds     FSER: lasix and transferd here     Chronically, has h/o  stroke complicated by vascular dementia, no residual focal deficits,mostly spends her day sitting, but does walk with walker and one person assist    Review of Systems:  Review of Systems:  Review of Systems   Constitutional: Negative for chills and fever. HENT: Negative for congestion and sore throat.    Eyes: Negative for pain and redness. Respiratory: Positive for shortness of breath. Negative for cough.    Cardiovascular: Negative for chest pain and leg swelling. Gastrointestinal: Negative for abdominal pain, diarrhea, nausea and vomiting. Endocrine: Negative for cold intolerance and heat intolerance. Genitourinary: Negative for dysuria, frequency, hematuria and urgency. Musculoskeletal: Negative for arthralgias and myalgias. Skin: Negative for pallor and rash. Neurological: Negative for dizziness, numbness and headaches. Psychiatric/Behavioral: Positive for hallucinations. Negative for agitation and dysphoric mood.     Objective:     Vitals:    03/26/21 0800 03/26/21 0803 03/26/21 1146 03/26/21 1200   BP:  128/80 (!) 103/51    Pulse: 80 80 80 80   Resp:  18 18    Temp:  98 °F (36.7 °C) 98.6 °F (37 °C)    SpO2:  97% 99%    Weight:       Height:            Intake and Output:  Current Shift: No intake/output data recorded. Last three shifts: 03/24 1901 - 03/26 0700  In: -   Out: 201 [Urine:200]      Physical Exam:   Physical Exam  Vitals signs and nursing note reviewed.    Constitutional:       General: She is not in acute distress.     Appearance: She is well-developed. She is not ill-appearing, toxic-appearing or diaphoretic. HENT:      Head: Normocephalic and atraumatic.      Nose: Nose normal.      Mouth/Throat:      Mouth: Mucous membranes are moist.      Pharynx: Oropharynx is clear. No oropharyngeal exudate or posterior oropharyngeal erythema. Eyes:      Extraocular Movements: Extraocular movements intact.      Conjunctiva/sclera: Conjunctivae normal.      Pupils: Pupils are equal, round, and reactive to light. Neck:      Musculoskeletal: Neck supple. No neck rigidity or muscular tenderness. Cardiovascular:      Rate and Rhythm: Normal rate and regular rhythm.      Heart sounds: Normal heart sounds. No murmur. No friction rub. No gallop.    Pulmonary:      Effort: Pulmonary effort is normal. No respiratory distress.      Breath sounds: Normal breath sounds. No wheezing, rhonchi or rales. Abdominal:      General: Abdomen is flat. There is no distension.      Palpations: Abdomen is soft.      Tenderness: There is no abdominal tenderness. Musculoskeletal: Normal range of motion.         General: No swelling or tenderness. Lymphadenopathy:      Cervical: No cervical adenopathy. Skin:     General: Skin is warm and dry.      Findings: No erythema. Neurological:      General: No focal deficit present.      Mental Status: She is alert and oriented to person, place, and time. Mental status is at baseline.      Cranial Nerves: No cranial nerve deficit.      Motor: No weakness.    Psychiatric:         Mood and Affect: Mood normal.         Behavior: Behavior normal.       Lab/Data Review:  Recent Results (from the past 24 hour(s))   ECHO ADULT COMPLETE    Collection Time: 03/26/21  9:15 AM   Result Value Ref Range    Pulmonic Regurgitant End Max Velocity 125.00 cm/s    AoV PG 6.00 mmHg    IVSd 1.38 (A) 0.60 - 0.90 cm    LVIDd 3.92 3.90 - 5.30 cm    LVIDs 2.56 cm    Pulmonic Regurgitant End Max Velocity 77.10 cm/s    LVOT Peak Gradient 2.00 mmHg    LVPWd 1.33 (A) 0.60 - 0.90 cm    LV E' Septal Velocity 3.12 cm/s    LV ED Vol A2C 60.20 cm3    BP EF 64.5 55.0 - 100.0 %    LV ES Vol A2C 16.80 cm3    Mitral Valve Deceleration Stevens 6,020.00 mm/s2    Mitral Valve Deceleration Stevens 6,020.00 mm/s2    Mitral Valve Pressure Half-time 58.00 ms    MVA (PHT) 3.79 cm2    Pulmonic Regurgitant End Max Velocity 98.00 cm/s    Pulmonic Valve Systolic Peak Instantaneous Gradient 4.00 mmHg    Est. RA Pressure 3.00 mmHg    RVIDd 2.71 cm    RVSP 23.00 mmHg    Tricuspid Valve Max Velocity 223.00 cm/s    Triscuspid Valve Regurgitation Peak Gradient 20.00 mmHg    Right Atrial Area 4C 19.27 cm2    LA Area 4C 26.19 cm2    LV Mass .0 67.0 - 162.0 g    LV Mass AL Index 120.9 43.0 - 95.0 g/m2    Left Atrium Minor Axis 3.07 cm    Left Atrium Major Axis 4.90 cm    Ao Root D 3.20 cm         Assessment and plan:      (1) AECHF unknown EF: lasix 40 IV daily, ECHO. Order magnesium.      (3) MODE : CRS. Continue lasix. (4)UTI: ceftriaxone     (4) pAFIB not on AC.      (5)  NSTEMI : likely type II s/t 1,2:      (6) hypercalcemia, albumin 3.5: likely over use of alendronatye/ calccium for osteoporosis.  Order PTH to evaluate.      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      DVT ppx: heparin SubQ     Dispo: PT today dc tomorrow    Signed By: Karoline Marks MD     March 26, 2021

## 2021-03-26 NOTE — PROGRESS NOTES
Problem: Mobility Impaired (Adult and Pediatric)  Goal: *Acute Goals and Plan of Care (Insert Text)  Description: Physical Therapy Goals  Initiated 3/26/2021  1)  I with HEP in 7 days to improve overall functional mobility. 2)  Bed mobility and transfers I in 7 days to prevent skin breakdown. 3)  Pt to amb 300ft with LRAD and sup in 7 days    Pt. Goal:  Pt to be able to walk safely without falls. Outcome: Not Met  PHYSICAL THERAPY EVALUATION  Patient: Wayne Nguyen (23 y.o. female)  Date: 3/26/2021  Primary Diagnosis: CHF (congestive heart failure) ([facility] [I50.9]        Precautions: fall       ASSESSMENT  Pt admitted for SOB x 3 days and LE swelling and dx with NSTEMI and MODE. Pt with hx of A fib, CHF, GERD, HTN, vascular dementia, CVA, previous hip replacements B, L  TKA, R ankle fusion. Pt reports she lives in a one story home with 3 PEDRO with B rails and daughter lives with her now since her  passed away on 3/2/21. Pt reports she was able to do ADL's I, but occasionally needs assist from her daughter. Pt reports she uses a cane, rollator, or RW and likes to rotate between all 3 devices to ensure she remembers how to use each of them; however, she prefers the rollator. Pt reports her daughter is always there with her and when she has to leave for some reason, they have a paid aide who comes in to look after pt. Pt reports a couple of falls more recently, one where she fell on the R hip; however, per chart, pt has had no fall hxPt A & O x 4 today, pleasant, cooperative, received semi-supine in bed. Based on the objective data described below, the patient presents with decreased LE strength, impaired balance, performing bed mobility with CGA, difficulty with transfers requiring min A, and able to amb with RW and CGA for safety. Pt required cues to stay within the walker and to increase step length. Also requires cues to  L foot as she tends to drag the foot due to CVA.   Pt would benefit from skilled PT services to improve strength and overall functional mobility and gait. Recommend d/c to home with family care and HHPT. Other factors to consider for discharge: impaired mobility, level of assist     Patient will benefit from skilled therapy intervention to address the above noted impairments. PLAN :  Recommendations and Planned Interventions: bed mobility training, transfer training, gait training, therapeutic exercises, patient and family training/education, and therapeutic activities      Frequency/Duration: Patient will be followed by physical therapy:  5 times a week to address goals. Recommendation for discharge: (in order for the patient to meet his/her long term goals)  HHPT    This discharge recommendation:  Has been made in collaboration with the attending provider and/or case management    IF patient discharges home will need the following DME: none         SUBJECTIVE:   Patient stated my right hip still hurts a little bit.     OBJECTIVE DATA SUMMARY:   HISTORY:    Past Medical History:   Diagnosis Date    Arrhythmia     atrial fib    Arthritis     Atrial fibrillation (HCC)     Chronic pain     In back and right foot    GERD (gastroesophageal reflux disease)     Hypercholesterolemia     Hypertension     Other ill-defined conditions(799.89)     right ventricular \"thickening\" of heart    Other ill-defined conditions(799.89)     blood transfusion hx--2012    Psychiatric disorder     depression    Thyroid disease     Unspecified adverse effect of anesthesia     Itching     Past Surgical History:   Procedure Laterality Date    HX CATARACT REMOVAL      bilateral    HX OPEN CHOLECYSTECTOMY      HX ORTHOPAEDIC      Right Foot Surgery x 6    HX ORTHOPAEDIC      plate in foot    HX ORTHOPAEDIC      HX ORTHOPAEDIC  3/12/12     RIGHT TOTAL HIP REPLACEMENT    HX OTHER SURGICAL      hemorrhoidectomy    HX TONSILLECTOMY         Personal factors and/or comorbidities impacting plan of care: impaired mobility, level of assist    Home Situation  Home Environment: Private residence  # Steps to Enter: 3  Rails to Enter: Yes  Hand Rails : Bilateral  One/Two Story Residence: One story  Living Alone: No  Support Systems: Child(deja)  Patient Expects to be Discharged to[de-identified] Private residence  Current DME Used/Available at Home: [employer], straight, Walker, rolling, Walker, rollator, [address] chair  Tub or Shower Type: Tub    PLOF: Pt A for ADLS/IADLS, MI with mobility prior to admission. EXAMINATION/PRESENTATION/DECISION MAKING:   Critical Behavior:  Neurologic State: Alert  Orientation Level: Oriented X4  Cognition: Decreased attention/concentration, Follows commands, Impaired decision making, Memory loss, Poor safety awareness     Hearing:   Auditory  Auditory Impairment: Hard of hearing, bilateral  Range Of Motion:  AROM: Within functional limits                       Strength:    Strength: Generally decreased, functional         Right Left   HIP FLEXION 4-/5 3+/5   HIP EXTENSION     HIP ABDUCTION     HIP ADDUCTION     KNEE FLEXION 4/5 3+/5   KNEE EXTENSION 4/5 3+/5   ANKLE PF  4/5 3/5   ANKLE DF 4/5 3-/5                     Functional Mobility:  Bed Mobility:  Rolling: Stand-by assistance  Supine to Sit: Contact guard assistance  Sit to Supine: Contact guard assistance  Scooting: Contact guard assistance  Transfers:  Sit to Stand: Minimum assistance  Stand to Sit: Minimum assistance                       Balance:   Sitting: Intact  Standing: Impaired  Standing - Static: Fair  Standing - Dynamic : Fair  Ambulation/Gait Training:  Distance (ft): 140 Feet (ft)  Assistive Device: Walker, rolling;Gait belt  Ambulation - Level of Assistance: Contact guard assistance     Gait Description (WDL): Exceptions to WDL                 Speed/Ashley: Slow  Step Length: Left shortened;Right shortened                    Functional Measure:    [facility] [place] [address] [address] AM-PAC 6 [facility]         Basic Mobility Inpatient Short Form  How much difficulty does the patient currently have. .. Unable A Lot A Little None   1. Turning over in bed (including adjusting bedclothes, sheets and blankets)? [] 1   [] 2   [] 3   [x] 4   2. Sitting down on and standing up from a chair with arms ( e.g., wheelchair, bedside commode, etc.)   [] 1   [] 2   [x] 3   [] 4   3. Moving from lying on back to sitting on the side of the bed? [] 1   [] 2   [x] 3   [] 4          How much help from another person does the patient currently need. .. Total A Lot A Little None   4. Moving to and from a bed to a chair (including a wheelchair)? [] 1   [] 2   [x] 3   [] 4   5. Need to walk in hospital room? [] 1   [] 2   [x] 3   [] 4   6. Climbing 3-5 steps with a railing? [] 1   [] 2   [x] 3   [] 4   © , Trustees of [address] [place] [address], under license to [employer]. All rights reserved     Score:  Initial: 19 Most Recent: X (Date:3/26/21)   Interpretation of Tool:  Represents activities that are increasingly more difficult (i.e. Bed mobility, Transfers, Gait). Score 24 23 22-20 19-15 14-10 9-7 6   Modifier CH CI CJ CK CL CM CN          Physical Therapy Evaluation Charge Determination   History Examination Presentation Decision-Making   MEDIUM  Complexity : 1-2 comorbidities / personal factors will impact the outcome/ POC  MEDIUM Complexity : 3 Standardized tests and measures addressing body structure, function, activity limitation and / or participation in recreation  LOW Complexity : Stable, uncomplicated  Other Functional Measure [facility] 6 low      Based on the above components, the patient evaluation is determined to be of the following complexity level: LOW     Pain Ratin/10 in R hip    Activity Tolerance:   Fair  Please refer to the flowsheet for vital signs taken during this treatment.     After treatment patient left in no apparent distress:   Supine in bed and Call bell within reach    COMMUNICATION/EDUCATION:   The patients plan of care was discussed with: Registered nurse and Case management. Patient/family agree to work toward stated goals and plan of care.     Thank you for this referral.  Kim Boss   Time Calculation: 29 mins

## 2021-03-27 VITALS
HEIGHT: 62 IN | TEMPERATURE: 97.9 F | BODY MASS INDEX: 23.94 KG/M2 | OXYGEN SATURATION: 98 % | SYSTOLIC BLOOD PRESSURE: 114 MMHG | DIASTOLIC BLOOD PRESSURE: 62 MMHG | RESPIRATION RATE: 18 BRPM | HEART RATE: 85 BPM | WEIGHT: 130.07 LBS

## 2021-03-27 PROCEDURE — 74011250636 HC RX REV CODE- 250/636: Performed by: HOSPITALIST

## 2021-03-27 PROCEDURE — 74011000258 HC RX REV CODE- 258: Performed by: HOSPITALIST

## 2021-03-27 PROCEDURE — 97530 THERAPEUTIC ACTIVITIES: CPT

## 2021-03-27 PROCEDURE — 74011250637 HC RX REV CODE- 250/637: Performed by: HOSPITALIST

## 2021-03-27 RX ORDER — CEPHALEXIN 500 MG/1
500 CAPSULE ORAL 2 TIMES DAILY
Qty: 6 CAP | Refills: 0 | Status: SHIPPED | OUTPATIENT
Start: 2021-03-27

## 2021-03-27 RX ADMIN — METOPROLOL TARTRATE 50 MG: 50 TABLET, FILM COATED ORAL at 10:44

## 2021-03-27 RX ADMIN — FUROSEMIDE 40 MG: 10 INJECTION, SOLUTION INTRAMUSCULAR; INTRAVENOUS at 10:44

## 2021-03-27 RX ADMIN — ACETAMINOPHEN 650 MG: 325 TABLET, FILM COATED ORAL at 04:32

## 2021-03-27 RX ADMIN — PANTOPRAZOLE SODIUM 40 MG: 40 TABLET, DELAYED RELEASE ORAL at 04:32

## 2021-03-27 RX ADMIN — PIPERACILLIN AND TAZOBACTAM 3.38 G: 3; .375 INJECTION, POWDER, FOR SOLUTION INTRAVENOUS at 10:48

## 2021-03-27 RX ADMIN — LEVOTHYROXINE SODIUM 25 MCG: 0.03 TABLET ORAL at 04:32

## 2021-03-27 RX ADMIN — HEPARIN SODIUM 5000 UNITS: 5000 INJECTION INTRAVENOUS; SUBCUTANEOUS at 04:18

## 2021-03-27 RX ADMIN — PIPERACILLIN AND TAZOBACTAM 3.38 G: 3; .375 INJECTION, POWDER, FOR SOLUTION INTRAVENOUS at 02:45

## 2021-03-27 NOTE — DISCHARGE INSTRUCTIONS
INSTRUCTIONS ON DISCHARGE    (1) please take KEFLEX 500mg twice a day for 2 more days    (2) F/u with cardiology in 1 week

## 2021-03-27 NOTE — PROGRESS NOTES
Problem: Mobility Impaired (Adult and Pediatric)  Goal: *Acute Goals and Plan of Care (Insert Text)  Description: Physical Therapy Goals  Initiated 3/26/2021  1)  I with HEP in 7 days to improve overall functional mobility. 2)  Bed mobility and transfers I in 7 days to prevent skin breakdown. 3)  Pt to amb 300ft with LRAD and sup in 7 days    Pt. Goal:  Pt to be able to walk safely without falls. Outcome: Progressing Towards Goal   PHYSICAL THERAPY TREATMENT  Patient: Luis Ellington (89 y.o. female)  Date: 3/27/2021  Diagnosis: CHF (congestive heart failure) ([facility]) [I50.9] <principal problem not specified>       Precautions:    Chart, physical therapy assessment, plan of care and goals were reviewed. ASSESSMENT  Patient continues with skilled PT services and is progressing towards goals. Upon entry into room, patient sleeping but easily awakened and agreeable to work with PT. \"I am all wet. \" Patient had PureWick but urine leaked on her and hosea pad. SBA for semi-supine<>sit and patient stood at bedside with RW so gown and hosea could be changed. No dizziness or LOB in standing. Patient then ambulated into bathroom to toilet and she stood at  toilet and was able to perform hygiene herself and clean up some. Patient then stood at sink to brush her teeth, wash her face and put on deodorant all with close SBA for safety. Gait is slow but she is steady on her feet with RW for support. Patient then returned to bed to sit up and eat breakfast. New PureWick donned. She will benefit from continued skilled PT services to improve her strength, standing balance and decrease assistance from caregivers to return to PLOF. Current Level of Function Impacting Discharge (mobility/balance): decreased standing balance, RW for gait     Other factors to consider for discharge: decreased cognition         PLAN :  Patient continues to benefit from skilled intervention to address the above impairments.   Continue treatment per established plan of care. to address goals. Recommendation for discharge: (in order for the patient to meet his/her long term goals)  Physical therapy at least 2 days/week in the home     This discharge recommendation:  Has been made in collaboration with the attending provider and/or case management    IF patient discharges home will need the following DME: rolling walker       SUBJECTIVE:   Patient stated I want to get up and clean up and then eat my breakfast. \"you are an absolute ari for helping me this morning. \"    OBJECTIVE DATA SUMMARY:   Critical Behavior:  Neurologic State: Alert, Confused  Orientation Level: Disoriented to time, Oriented to person, Oriented to place, Oriented to situation  Cognition: Appropriate decision making, Follows commands, Appropriate safety awareness     Functional Mobility Training:  Bed Mobility:     Supine to Sit: Stand-by assistance  Sit to Supine: Stand-by assistance  Scooting: Stand-by assistance        Transfers:  Sit to Stand: Minimum assistance  Stand to Sit: Minimum assistance       Balance:  Sitting: Intact  Standing: Impaired  Standing - Static: Good;Constant support  Standing - Dynamic : Fair;Constant support  Ambulation/Gait Training:  Distance (ft): 20 Feet (ft)  Assistive Device: Gait belt;Walker, rolling  Ambulation - Level of Assistance: Contact guard assistance       Speed/Ashley: Slow;Shuffled      Therapeutic Exercises:   Not today   Pain Rating:  No pain reported     Activity Tolerance:   Good and tolerates ADLs without rest breaks  Please refer to the flowsheet for vital signs taken during this treatment.     After treatment patient left in no apparent distress:   Supine in bed, Call bell within reach, Bed / chair alarm activated, and patient in bed eating breakfast     COMMUNICATION/COLLABORATION:   The patients plan of care was discussed with: Physical therapist and Occupational therapist.     David Perez   Time Calculation: 32 mins

## 2021-03-27 NOTE — DISCHARGE SUMMARY
Physician Discharge Summary     Patient ID:    Marlo Dorsey  [id number]  80 y.o.  3/3/1933    Admit date: 3/24/2021    Discharge date : 3/27/2021    Chronic Diagnoses:    Problem List as of 3/27/2021 Date Reviewed: 11/19/2020          Codes Class Noted - Resolved    CHF (congestive heart failure) ([facility] ICD-10-CM: I50.9  ICD-9-CM: 428.0  3/24/2021 - Present        Chronic atrial fibrillation ([facility] ICD-10-CM: I48.20  ICD-9-CM: 427.31  11/19/2020 - Present        Acute delirium ICD-10-CM: R41.0  ICD-9-CM: 780.09  11/16/2020 - Present        Primary localized osteoarthrosis, pelvic region and thigh ICD-10-CM: M16.10  ICD-9-CM: 715.15  11/17/2014 - Present        DJD (degenerative joint disease) of hip ICD-10-CM: M16.9  ICD-9-CM: 715.95  3/13/2012 - Present          22    Final Diagnoses:   CHF (congestive heart failure) ([facility] [I50.9]    Reason for Hospitalization:  Chronic Afib  AECHFrEF  UTI      88F, H/o chronic Afib, CHF unknown EF, with shortness of breath since 3 days s/t AECHF and UTI. Hospital Course:   Treated wth zosyn and IV lasix  Ucx pending      INSTRUCTIONS ON DISCHARGE    (1) please take KEFLEX 500mg twice a day for 2 more days    (2) F/u with cardiology in 1 week            Discharge Medications:   Current Discharge Medication List      CONTINUE these medications which have NOT CHANGED    Details   alendronate (FOSAMAX) 70 mg tablet alendronate 70 mg tablet      furosemide (LASIX) 40 mg tablet furosemide 40 mg tablet      mirtazapine (REMERON) 15 mg tablet       polyethylene glycol (MIRALAX) 17 gram packet Take 1 packet by mouth daily as needed (constipation). Qty: 1 packet, Refills: 0      simvastatin (ZOCOR) 80 mg tablet Take 80 mg by mouth nightly. meclizine (ANTIVERT) 25 mg tablet Take 25 mg by mouth as needed. metoprolol (LOPRESSOR) 50 mg tablet Take 50 mg by mouth two (2) times a day.       levothyroxine (SYNTHROID) 25 mcg tablet Take 25 mcg by mouth Daily (before breakfast). Dexlansoprazole (DEXILANT) 60 mg CpDB Take  by mouth nightly. lisinopril (PRINIVIL, ZESTRIL) 20 mg tablet Take 20 mg by mouth every morning. Follow up Care:    1. Keenan Walker MD in 1-2 weeks. Please call to set up an appointment shortly after discharge. Diet:  cardiac    Disposition:  bonita e with HH PT     Advanced Directive:   FULL x   DNR      Discharge Exam:  Vitals signs and nursing note reviewed. Constitutional:       General: She is not in acute distress.     Appearance: She is well-developed. She is not ill-appearing, toxic-appearing or diaphoretic. HENT:      Head: Normocephalic and atraumatic.      Nose: Nose normal.      Mouth/Throat:      Mouth: Mucous membranes are moist.      Pharynx: Oropharynx is clear. No oropharyngeal exudate or posterior oropharyngeal erythema. Eyes:      Extraocular Movements: Extraocular movements intact.      Conjunctiva/sclera: Conjunctivae normal.      Pupils: Pupils are equal, round, and reactive to light. Neck:      Musculoskeletal: Neck supple. No neck rigidity or muscular tenderness. Cardiovascular:      Rate and Rhythm: Normal rate and regular rhythm.      Heart sounds: Normal heart sounds. No murmur. No friction rub. No gallop.    Pulmonary:      Effort: Pulmonary effort is normal. No respiratory distress.      Breath sounds: Normal breath sounds. No wheezing, rhonchi or rales. Abdominal:      General: Abdomen is flat. There is no distension.      Palpations: Abdomen is soft.      Tenderness: There is no abdominal tenderness. Musculoskeletal: Normal range of motion.         General: No swelling or tenderness. Lymphadenopathy:      Cervical: No cervical adenopathy. Skin:     General: Skin is warm and dry.      Findings: No erythema. Neurological:      General: No focal deficit present.      Mental Status: She is alert and oriented to person, place, and time.  Mental status is at baseline.      Cranial Nerves: No cranial nerve deficit.      Motor: No weakness. Psychiatric: Susan Negus and Affect: Mood normal.         Behavior: Behavior normal.     CONSULTATIONS: cardiology    Significant Diagnostic Studies:     Radiologic Studies -   Results from Hospital Encounter encounter on 03/24/21   XR CHEST PORT    Narrative Chest single view     Comparison single view chest November 16, 2020. Lungs clear. No interstitial or alveolar pulmonary edema. Cardiac and  mediastinal structures unchanged. No pneumothorax or sizable pleural effusion.           CT Results  (Last 48 hours)    None              Discharge time spent 35 minutes    Signed:  Dorinda Eisenmenger, MD  3/27/2021  11:09 AM

## 2021-03-27 NOTE — PROGRESS NOTES
GLORIA Plan:    -d/c home w/family  -PCP follow up  -[facility] via [address]       Patient to discharge home w/family. PCP follow up for this inpatient admission. Accepted for [facility] via [address]. SOC x 24-48 hours. Medicare pt has received, reviewed, and signed 2nd IM letter informing them of their right to appeal the discharge. Signed copy has been placed on pt bedside chart.       ELEAZAR Chacon

## 2021-03-27 NOTE — PROGRESS NOTES
Attempted to see patient for evaluation. Pt currently receiving treatment from another service. Will attempt at a later time/date. Thank you.

## 2021-03-27 NOTE — ROUTINE PROCESS
Patient discharged home with Home/Health. Education on medications and follow appointments given to patient's daughter and patient. Discharge plan of care/case management plan validated with provider discharge order.

## 2021-03-28 LAB
BACTERIA SPEC CULT: ABNORMAL
COLONY COUNT,CNT: ABNORMAL
COLONY COUNT,CNT: ABNORMAL
SPECIAL REQUESTS,SREQ: ABNORMAL

## 2022-03-18 PROBLEM — I48.20 CHRONIC ATRIAL FIBRILLATION (HCC): Status: ACTIVE | Noted: 2020-11-19

## 2022-03-19 PROBLEM — R41.0 ACUTE DELIRIUM: Status: ACTIVE | Noted: 2020-11-16

## 2022-03-19 PROBLEM — I50.9 CHF (CONGESTIVE HEART FAILURE) (HCC): Status: ACTIVE | Noted: 2021-03-24

## 2023-05-14 RX ORDER — LEVOTHYROXINE SODIUM 0.03 MG/1
25 TABLET ORAL
COMMUNITY

## 2023-05-14 RX ORDER — SIMVASTATIN 80 MG
80 TABLET ORAL NIGHTLY
COMMUNITY

## 2023-05-14 RX ORDER — METOPROLOL TARTRATE 50 MG/1
50 TABLET, FILM COATED ORAL 2 TIMES DAILY
COMMUNITY

## 2023-05-14 RX ORDER — ALENDRONATE SODIUM 70 MG/1
TABLET ORAL
COMMUNITY

## 2023-05-14 RX ORDER — FUROSEMIDE 40 MG/1
TABLET ORAL
COMMUNITY

## 2023-05-14 RX ORDER — MIRTAZAPINE 15 MG/1
TABLET, FILM COATED ORAL
COMMUNITY
Start: 2021-03-01

## 2023-05-14 RX ORDER — DEXLANSOPRAZOLE 60 MG/1
CAPSULE, DELAYED RELEASE ORAL
COMMUNITY

## 2023-05-14 RX ORDER — POLYETHYLENE GLYCOL 3350 17 G/17G
17 POWDER, FOR SOLUTION ORAL DAILY PRN
COMMUNITY
Start: 2014-11-20

## 2023-05-14 RX ORDER — CEPHALEXIN 500 MG/1
500 CAPSULE ORAL 2 TIMES DAILY
COMMUNITY
Start: 2021-03-27

## 2023-05-14 RX ORDER — MECLIZINE HYDROCHLORIDE 25 MG/1
25 TABLET ORAL PRN
COMMUNITY

## 2023-05-14 RX ORDER — LISINOPRIL 20 MG/1
20 TABLET ORAL
COMMUNITY